# Patient Record
Sex: MALE | Race: WHITE | Employment: OTHER | URBAN - METROPOLITAN AREA
[De-identification: names, ages, dates, MRNs, and addresses within clinical notes are randomized per-mention and may not be internally consistent; named-entity substitution may affect disease eponyms.]

---

## 2021-06-09 ENCOUNTER — OFFICE VISIT (OUTPATIENT)
Dept: URGENT CARE | Age: 61
End: 2021-06-09

## 2021-06-09 VITALS
DIASTOLIC BLOOD PRESSURE: 91 MMHG | TEMPERATURE: 98.3 F | HEART RATE: 77 BPM | OXYGEN SATURATION: 97 % | RESPIRATION RATE: 18 BRPM | SYSTOLIC BLOOD PRESSURE: 166 MMHG

## 2021-06-09 DIAGNOSIS — L03.012 PARONYCHIA OF LEFT THUMB: ICD-10-CM

## 2021-06-09 DIAGNOSIS — S60.112A SUBUNGUAL HEMATOMA OF LEFT THUMB, INITIAL ENCOUNTER: Primary | ICD-10-CM

## 2021-06-09 PROCEDURE — 99202 OFFICE O/P NEW SF 15 MIN: CPT | Performed by: FAMILY MEDICINE

## 2021-06-09 RX ORDER — CEPHALEXIN 500 MG/1
500 CAPSULE ORAL 3 TIMES DAILY
Qty: 21 CAPSULE | Refills: 0 | Status: SHIPPED | OUTPATIENT
Start: 2021-06-09 | End: 2021-06-16

## 2021-06-09 NOTE — PATIENT INSTRUCTIONS
Object Under Fingernail: Care Instructions Your Care Instructions Small pieces (splinters) of wood, metal, glass, or plastic can get stuck under a fingernail. Thorns from [employer] and other plants also can prick or become stuck in the skin. Splinters can cause pain and infection if they are not removed. If your doctor removed part of your nail, it should grow back normally As your wound heals, it may get a little red or swollen. But it should get better with time. Follow-up care is a key part of your treatment and safety. Be sure to make and go to all appointments, and call your doctor if you are having problems. It's also a good idea to know your test results and keep a list of the medicines you take. How can you care for yourself at home? · If your doctor told you how to care for your wound, follow your doctor's instructions. If you did not get instructions, follow this general advice: 
? Wash the wound with clean water 2 times a day. Don't use hydrogen peroxide or alcohol, which can slow healing. ? You may cover the wound with a thin layer of petroleum jelly, such as Vaseline, and a nonstick bandage. ? Apply more petroleum jelly and replace the bandage as needed. · Your doctor may have used medicine to numb your finger. When it wears off, your pain may come back. Take an over-the-counter pain medicine, such as acetaminophen (Tylenol), ibuprofen (Advil, Motrin), or naproxen (Aleve). Be safe with medicines. Read and follow all instructions on the label. · Do not take two or more pain medicines at the same time unless the doctor told you to. Many pain medicines have acetaminophen, which is Tylenol. Too much acetaminophen (Tylenol) can be harmful. · If your doctor prescribed antibiotics, take them as directed. Do not stop taking them just because you feel better. You need to take the full course of antibiotics.  
· It may help to prop up your hand on a pillow anytime you sit or lie down during the next 3 days. Try to keep it above the level of your heart. This will help reduce swelling. When should you call for help? Call your doctor now or seek immediate medical care if: 
  · Your finger is cold or pale or changes color.  
  · You have new pain, or your pain gets worse.  
  · You have tingling, weakness, or numbness in your finger.  
  · You have symptoms of infection, such as: 
? Increased pain, swelling, warmth, or redness around the nail. ? Red streaks leading from the nail. ? Pus draining from the area. ? A fever.  
  · You bleed through your bandage. Watch closely for changes in your health, and be sure to contact your doctor if: 
  · You do not get better as expected.  
  · You think there is still something under the fingernail. Where can you learn more? Go to http://lkuas-raheem.info/ Enter L372 in the search box to learn more about \"Object Under Fingernail: Care Instructions. \" Current as of: February 26, 2020               Content Version: 12.8 © 2006-2021 [employer]. Care instructions adapted under license by [employer] (which disclaims liability or warranty for this information). If you have questions about a medical condition or this instruction, always ask your healthcare professional. [address] any warranty or liability for your use of this information. Paronychia: Care Instructions Overview Paronychia (say \"jqwj-ml-MZ-tonio-uh\") is an inflammation of the skin around a fingernail or toenail. It happens when germs enter through a break in the skin. If you had an abscess, your doctor may have made a small cut in the infected area to drain the pus. Most cases of paronychia improve in a few days. But watch your symptoms and follow your doctor's advice. Though rare, a mild case can turn into something more serious and infect your entire finger or toe. Also, it is possible for an infection to return.  
Follow-up care is a key part of your treatment and safety. Be sure to make and go to all appointments, and call your doctor if you are having problems. It's also a good idea to know your test results and keep a list of the medicines you take. How can you care for yourself at home? · If your doctor told you how to care for your infected nail, follow the doctor's instructions. If you did not get instructions, follow this general advice: 
? Wash the area with clean water 2 times a day. Don't use hydrogen peroxide or alcohol, which can slow healing. ? You may cover the area with a thin layer of petroleum jelly, such as Vaseline, and a nonstick bandage. ? Apply more petroleum jelly and replace the bandage as needed. · If your doctor prescribed antibiotics, take them as directed. Do not stop taking them just because you feel better. You need to take the full course of antibiotics. · Take an over-the-counter pain medicine, such as acetaminophen (Tylenol), ibuprofen (Advil, Motrin), or naproxen (Aleve). Read and follow all instructions on the label. · Do not take two or more pain medicines at the same time unless the doctor told you to. Many pain medicines have acetaminophen, which is Tylenol. Too much acetaminophen (Tylenol) can be harmful. · Prop up the toe or finger so that it is higher than the level of your heart. This will help with pain and swelling. · Apply heat. Put a warm water bottle, heating pad set on low, or warm cloth on your finger or toe. Do not go to sleep with a heating pad on your skin. · Soak the area in warm water twice a day for 15 minutes each time. After soaking, dry the area well and apply a thin layer of petroleum jelly, such as Vaseline. Put on a new bandage. When should you call for help? Call your doctor now or seek immediate medical care if: 
  · You have signs of new or worsening infection, such as: 
? Increased pain, swelling, warmth, or redness.  
? Red streaks leading from the infected skin. 
? Pus draining from the area. ? A fever. Watch closely for changes in your health, and be sure to contact your doctor if: 
  · You do not get better as expected. Where can you learn more? Go to http://www.alaniz.com/ Enter C435 in the search box to learn more about \"Paronychia: Care Instructions. \" Current as of: July 2, 2020               Content Version: 12.8 © 2006-2021 [employer], [employer]. Care instructions adapted under license by [employer] (which disclaims liability or warranty for this information). If you have questions about a medical condition or this instruction, always ask your healthcare professional. [place] [address] any warranty or liability for your use of this information.

## 2021-06-09 NOTE — PROGRESS NOTES
Finger Pain  This is a new problem. The current episode started 2 days ago (crushing injury of left thumnb on nail ). The problem occurs constantly. The problem has not changed since onset. Associated symptoms comments: subungual hematoma- swelling of thumb and pain with numbness- also some redness at base of nail-   His friend try to drain blood with not needle- not much came out . Exacerbated by: pressure. Nothing relieves the symptoms. He has tried nothing for the symptoms. History reviewed. No pertinent past medical history. History reviewed. No pertinent surgical history. History reviewed. No pertinent family history. Social History     Socioeconomic History    Marital status:      Spouse name: Not on file    Number of children: Not on file    Years of education: Not on file    Highest education level: Not on file   Occupational History    Not on file   Tobacco Use    Smoking status: Never Smoker    Smokeless tobacco: Never Used   Substance and Sexual Activity    Alcohol use: Yes     Comment: 3-4 beers or rum & coke daily    Drug use: Never    Sexual activity: Not on file   Other Topics Concern    Not on file   Social History Narrative    Not on file     Social Determinants of Health     Financial Resource Strain:     Difficulty of Paying Living Expenses:    Food Insecurity:     Worried About Running Out of Food in the Last Year:     [address] in the Last Year:    Transportation Needs:     Lack of Transportation (Medical):      Lack of Transportation (Non-Medical):    Physical Activity:     Days of Exercise per Week:     Minutes of Exercise per Session:    Stress:     Feeling of Stress :    Social Connections:     Frequency of Communication with Friends and Family:     Frequency of Social Gatherings with Friends and Family:     Attends [employer] Services:     Active Member of Clubs or Organizations:     Attends Club or Organization Meetings:     Marital Status:    Intimate Partner Violence:     Fear of Current or Ex-Partner:     Emotionally Abused:     Physically Abused:     Sexually Abused: ALLERGIES: Patient has no known allergies. Review of Systems   Musculoskeletal:        Swelling of left thumb    All other systems reviewed and are negative. Vitals:    06/09/21 1903   BP: (!) 166/91   Pulse: 77   Resp: 18   Temp: 98.3 °F (36.8 °C)   SpO2: 97%       Physical Exam  Vitals and nursing note reviewed. Musculoskeletal:      Left hand: Swelling and tenderness present. No bony tenderness. Normal range of motion. Normal strength. Normal sensation. Hands:          MDM    Procedures      ICD-10-CM ICD-9-CM    1. Subungual hematoma of left thumb, initial encounter  S60.112A 923.3    2. Paronychia of left thumb  L03.012 681.02    heat cautry used to drain blood through 2 hole paced   Antibiotic ointment dressing appled        Medications Ordered Today   Medications    cephALEXin (KEFLEX) 500 mg capsule     Sig: Take 1 Capsule by mouth three (3) times daily for 7 days. Dispense:  21 Capsule     Refill:  0     No results found for any visits on 06/09/21. The patients condition was discussed with the patient and they understand. The patient is to follow up with primary care doctor. If signs and symptoms become worse the pt is to go to the ER. The patient is to take medications as prescribed.

## 2021-07-14 ENCOUNTER — OFFICE VISIT (OUTPATIENT)
Dept: URGENT CARE | Age: 61
End: 2021-07-14
Payer: OTHER GOVERNMENT

## 2021-07-14 VITALS
SYSTOLIC BLOOD PRESSURE: 130 MMHG | DIASTOLIC BLOOD PRESSURE: 84 MMHG | WEIGHT: 252 LBS | OXYGEN SATURATION: 98 % | TEMPERATURE: 98.4 F | HEIGHT: 77 IN | BODY MASS INDEX: 29.76 KG/M2 | RESPIRATION RATE: 16 BRPM | HEART RATE: 65 BPM
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
## 2021-07-14 DIAGNOSIS — L03.114 CELLULITIS OF LEFT UPPER EXTREMITY: ICD-10-CM

## 2021-07-14 PROCEDURE — 99213 OFFICE O/P EST LOW 20 MIN: CPT | Performed by: EMERGENCY MEDICINE

## 2021-07-14 RX ORDER — CEPHALEXIN 500 MG/1
500 CAPSULE ORAL 3 TIMES DAILY
Qty: 21 CAPSULE | Refills: 0 | Status: SHIPPED | OUTPATIENT
Start: 2021-07-14 | End: 2021-07-21

## 2021-07-14 NOTE — PROGRESS NOTES
Pt bitten by an insect yesterday and increased redness and a lot of itching. No pain. No numbness or swelling. No constitutional sx. No red streaking. Has a h/o cellulitis from similar and is concerned. He is left hand dominant    The history is provided by the patient. History reviewed. No pertinent past medical history. History reviewed. No pertinent surgical history. History reviewed. No pertinent family history. Social History     Socioeconomic History    Marital status:      Spouse name: Not on file    Number of children: Not on file    Years of education: Not on file    Highest education level: Not on file   Occupational History    Not on file   Tobacco Use    Smoking status: Never Smoker    Smokeless tobacco: Never Used   Substance and Sexual Activity    Alcohol use: Yes     Comment: 3-4 beers or rum & coke daily    Drug use: Never    Sexual activity: Not on file   Other Topics Concern    Not on file   Social History Narrative    Not on file     Social Determinants of Health     Financial Resource Strain:     Difficulty of Paying Living Expenses:    Food Insecurity:     Worried About Running Out of Food in the Last Year:     [address] in the Last Year:    Transportation Needs:     Lack of Transportation (Medical):  Lack of Transportation (Non-Medical):    Physical Activity:     Days of Exercise per Week:     Minutes of Exercise per Session:    Stress:     Feeling of Stress :    Social Connections:     Frequency of Communication with Friends and Family:     Frequency of Social Gatherings with Friends and Family:     Attends Gn[employer] Services:     Active Member of Clubs or Organizations:     Attends Club or Organization Meetings:     Marital Status:    Intimate Partner Violence:     Fear of Current or Ex-Partner:     Emotionally Abused:     Physically Abused:     Sexually Abused:                  ALLERGIES: Patient has no known allergies. Review of Systems   Constitutional: Negative for chills, fatigue and fever. HENT: Negative for facial swelling, sore throat and trouble swallowing. Respiratory: Negative for chest tightness and shortness of breath. Cardiovascular: Negative for chest pain. Musculoskeletal: Negative for arthralgias, joint swelling and myalgias. Skin: Positive for color change and wound (bug bite and local erythema). Neurological: Negative for dizziness and headaches. Vitals:    07/14/21 1158   BP: 130/84   Pulse: 65   Resp: 16   Temp: 98.4 °F (36.9 °C)   SpO2: 98%   Weight: 252 lb (114.3 kg)   Height: 6' 5\" (1.956 m)       Physical Exam  Vitals and nursing note reviewed. Constitutional:       General: He is not in acute distress. Appearance: Normal appearance. He is normal weight. He is not ill-appearing or toxic-appearing. HENT:      Nose: No rhinorrhea. Mouth/Throat:      Mouth: Mucous membranes are moist.      Pharynx: Oropharynx is clear. No oropharyngeal exudate or posterior oropharyngeal erythema. Cardiovascular:      Rate and Rhythm: Normal rate and regular rhythm. Heart sounds: Normal heart sounds. Pulmonary:      Effort: Pulmonary effort is normal. No respiratory distress. Breath sounds: Normal breath sounds. No stridor. No wheezing, rhonchi or rales. Lymphadenopathy:      Cervical: No cervical adenopathy. Skin:     Capillary Refill: Capillary refill takes less than 2 seconds. Findings: Erythema and wound present. Urticarial rash: Willetta Smiyoni Comments: There is a slightly irregular area of NT, blanchable erythema on the ventral surface of the distal left FA with a central vesicle. The area is approximately 4cm in diameter. It was marked with a pen. There is not lymphatic streaking. There is no limitation noted in ROM of the left wrist, hand or FA. No redness distal. No swelling    Neurological:      General: No focal deficit present.       Mental Status: He is alert and oriented to person, place, and time. Psychiatric:         Mood and Affect: Mood normal.         [facility]    ICD-10-CM ICD-9-CM    1. Bug bite, initial encounter  W57. XXXA 919.4    2. Cellulitis of left upper extremity  L03.114 682.3      Medications Ordered Today   Medications    cephALEXin (Keflex) 500 mg capsule     Sig: Take 1 Capsule by mouth three (3) times daily for 7 days. Dispense:  21 Capsule     Refill:  0     The patient's condition and possible alternative diagnoses were discussed with the patient and they verbalized understanding. The patient is to follow up with their primary care doctor for continued care. If signs and symptoms persist or become worse or new symptoms develop, the pt is to go immediately to the emergency department. Any new medications that may have been written for should be taken as directed but should always be discussed with the primary care physician and pharmacist. This was communicated to the patient.              Procedures

## 2021-08-31 ENCOUNTER — APPOINTMENT (OUTPATIENT)
Dept: ULTRASOUND IMAGING | Age: 61
End: 2021-08-31
Attending: STUDENT IN AN ORGANIZED HEALTH CARE EDUCATION/TRAINING PROGRAM
Payer: OTHER GOVERNMENT

## 2021-08-31 ENCOUNTER — HOSPITAL ENCOUNTER (EMERGENCY)
Age: 61
Discharge: HOME | End: 2021-08-31
Attending: STUDENT IN AN ORGANIZED HEALTH CARE EDUCATION/TRAINING PROGRAM
Payer: OTHER GOVERNMENT

## 2021-08-31 VITALS
DIASTOLIC BLOOD PRESSURE: 74 MMHG | WEIGHT: 252.87 LBS | SYSTOLIC BLOOD PRESSURE: 127 MMHG | OXYGEN SATURATION: 97 % | HEIGHT: 77 IN | TEMPERATURE: 98.3 F | RESPIRATION RATE: 16 BRPM | BODY MASS INDEX: 29.86 KG/M2 | HEART RATE: 70 BPM

## 2021-08-31 DIAGNOSIS — I80.9 THROMBOPHLEBITIS: Primary | ICD-10-CM

## 2021-08-31 PROCEDURE — 93971 EXTREMITY STUDY: CPT

## 2021-08-31 PROCEDURE — 99283 EMERGENCY DEPT VISIT LOW MDM: CPT

## 2021-08-31 RX ORDER — NAPROXEN 500 MG/1
500 TABLET ORAL
Qty: 20 TABLET | Refills: 0 | Status: SHIPPED | OUTPATIENT
Start: 2021-08-31 | End: 2022-07-09

## 2021-08-31 NOTE — ED PROVIDER NOTES
EMERGENCY DEPARTMENT HISTORY AND PHYSICAL EXAM      Date: 8/31/2021  Patient Name: Pilar Bansal    History of Presenting Illness     Chief Complaint   Patient presents with    Leg Pain     redness and swelling left medial calf since  yesterday. pt had hernia surgery last week         HPI: Pilar Bansal, 57 y.o. male presents to the ED with cc of left leg pain. He noticed this yesterday. He reports increasing redness, swelling and tenderness to touch of his left leg, more over the medial aspect but now he feels the whole leg is swollen. He has a history of varicose veins, however they have never swollen to this extent before. No history of trauma. No fevers, no chest pain or shortness of breath, no weakness or numbness in this extremity. He did have hernia surgery 1 week ago, denies any complications related to that. There are no other complaints, changes, or physical findings at this time. PCP: Unknown, Provider, MD    No current facility-administered medications on file prior to encounter. Current Outpatient Medications on File Prior to Encounter   Medication Sig Dispense Refill    MEN'S MULTI-VITAMIN PO Take  by mouth. Past History     Past Medical History:  No past medical history on file. Past Surgical History:  No past surgical history on file. Family History:  No family history on file. Social History:  Social History     Tobacco Use    Smoking status: Never Smoker    Smokeless tobacco: Never Used   Substance Use Topics    Alcohol use: Yes     Comment: 3-4 beers or rum & coke daily    Drug use: Never       Allergies:  No Known Allergies      Review of Systems   no fever  No eye pain  No ear pain  no shortness of breath  no chest pain  no abdominal pain  no dysuria  Reports left leg pain    Physical Exam   Physical Exam  Constitutional:       General: He is not in acute distress. Appearance: He is not toxic-appearing. HENT:      Head: Normocephalic. Eyes:      Extraocular Movements: Extraocular movements intact. Cardiovascular:      Rate and Rhythm: Normal rate and regular rhythm. Pulmonary:      Effort: Pulmonary effort is normal.      Breath sounds: Normal breath sounds. Abdominal:      Palpations: Abdomen is soft. Tenderness: There is no abdominal tenderness. Musculoskeletal:      Comments: Left leg over the medial shin and there is approximately golf ball sized area of firm slightly tender erythema, there are varicose veins scattered over the legs. The left leg distal to the knee is slightly more swollen as compared to the right. He has strong DP pulses bilaterally. Sensation to light touch intact diffusely, 5 out of 5 strength with ankle flexion extension bilaterally. Skin:     General: Skin is warm and dry. Neurological:      General: No focal deficit present. Mental Status: He is alert. Psychiatric:         Mood and Affect: Mood normal.         Diagnostic Study Results     Labs -   No results found for this or any previous visit (from the past 24 hour(s)). Radiologic Studies -   DUPLEX LOWER EXT VENOUS LEFT    (Results Pending)     CT Results  (Last 48 hours)    None        CXR Results  (Last 48 hours)    None            Medical Decision Making   I am the first provider for this patient. I reviewed the vital signs, available nursing notes, past medical history, past surgical history, family history and social history. Vital Signs-Reviewed the patient's vital signs. Patient Vitals for the past 24 hrs:   Temp Pulse Resp BP SpO2   08/31/21 1235 98.3 °F (36.8 °C) 70 16 (!) 141/83 97 %         Provider Notes (Medical Decision Making):   59-year-old male presenting with left leg pain. He has focal area of redness and swelling and slight tenderness over the medial leg. Concern for possible superficial thrombophlebitis, will assess for DVT with duplex. Possible early cellulitis. No signs of abscess and no fluctuance. He is neurovascularly intact, no signs of systemic infection. He denies any cardiopulmonary complaints. ED Course:     Initial assessment performed. The patients presenting problems have been discussed, and they are in agreement with the care plan formulated and outlined with them. I have encouraged them to ask questions as they arise throughout their visit. Ultrasound shows thrombosed varicosity, no evidence of DVT. Findings are relayed to the patient. Patient is counseled on supportive care and return precautions. Will return to the ED for any worsening pain, redness, swelling, chest pain, shortness of breath, fever, or any new or worrisome symptoms. Will followup with primary care doctor, vascular clinic within 7 days. Critical Care Time:         Disposition:  Home    PLAN:  1. Current Discharge Medication List        2.    Follow-up Information    None       Return to ED if worse     Diagnosis     Clinical Impression: Acute superficial thrombophlebitis

## 2022-01-31 ENCOUNTER — OFFICE VISIT (OUTPATIENT)
Dept: FAMILY MEDICINE CLINIC | Age: 62
End: 2022-01-31
Payer: OTHER GOVERNMENT

## 2022-01-31 VITALS
DIASTOLIC BLOOD PRESSURE: 87 MMHG | RESPIRATION RATE: 18 BRPM | HEIGHT: 77 IN | HEART RATE: 59 BPM | SYSTOLIC BLOOD PRESSURE: 137 MMHG | TEMPERATURE: 98.3 F | WEIGHT: 251.4 LBS | OXYGEN SATURATION: 98 % | BODY MASS INDEX: 29.68 KG/M2

## 2022-01-31 DIAGNOSIS — Z13.29 SCREENING FOR THYROID DISORDER: ICD-10-CM

## 2022-01-31 DIAGNOSIS — Z13.220 SCREENING, LIPID: ICD-10-CM

## 2022-01-31 DIAGNOSIS — Z12.5 SPECIAL SCREENING EXAMINATION FOR NEOPLASM OF PROSTATE: ICD-10-CM

## 2022-01-31 DIAGNOSIS — Z00.00 LABORATORY EXAM ORDERED AS PART OF ROUTINE GENERAL MEDICAL EXAMINATION: ICD-10-CM

## 2022-01-31 DIAGNOSIS — E55.9 VITAMIN D INSUFFICIENCY: ICD-10-CM

## 2022-01-31 DIAGNOSIS — Z11.59 ENCOUNTER FOR HEPATITIS C SCREENING TEST FOR LOW RISK PATIENT: ICD-10-CM

## 2022-01-31 DIAGNOSIS — I83.893 VARICOSE VEINS OF BILATERAL LOWER EXTREMITIES WITH OTHER COMPLICATIONS: ICD-10-CM

## 2022-01-31 DIAGNOSIS — Z00.00 ENCOUNTER FOR MEDICAL EXAMINATION TO ESTABLISH CARE: Primary | ICD-10-CM

## 2022-01-31 DIAGNOSIS — R22.43 LOCALIZED SWELLING OF BOTH LOWER LEGS: ICD-10-CM

## 2022-01-31 LAB
25(OH)D3 SERPL-MCNC: 27.1 NG/ML (ref 30–100)
ALBUMIN SERPL-MCNC: 3.9 G/DL (ref 3.5–5)
ALBUMIN/GLOB SERPL: 1 {RATIO} (ref 1.1–2.2)
ALP SERPL-CCNC: 67 U/L (ref 45–117)
ALT SERPL-CCNC: 26 U/L (ref 12–78)
ANION GAP SERPL CALC-SCNC: 1 MMOL/L (ref 5–15)
AST SERPL-CCNC: 23 U/L (ref 15–37)
BASOPHILS # BLD: 0.1 K/UL (ref 0–0.1)
BASOPHILS NFR BLD: 1 % (ref 0–1)
BILIRUB SERPL-MCNC: 0.8 MG/DL (ref 0.2–1)
BUN SERPL-MCNC: 14 MG/DL (ref 6–20)
BUN/CREAT SERPL: 14 (ref 12–20)
CALCIUM SERPL-MCNC: 9.3 MG/DL (ref 8.5–10.1)
CHLORIDE SERPL-SCNC: 106 MMOL/L (ref 97–108)
CHOLEST SERPL-MCNC: 197 MG/DL
CO2 SERPL-SCNC: 29 MMOL/L (ref 21–32)
CREAT SERPL-MCNC: 0.97 MG/DL (ref 0.7–1.3)
DIFFERENTIAL METHOD BLD: ABNORMAL
EOSINOPHIL # BLD: 0.1 K/UL (ref 0–0.4)
EOSINOPHIL NFR BLD: 2 % (ref 0–7)
ERYTHROCYTE [DISTWIDTH] IN BLOOD BY AUTOMATED COUNT: 12.3 % (ref 11.5–14.5)
GLOBULIN SER CALC-MCNC: 4 G/DL (ref 2–4)
GLUCOSE SERPL-MCNC: 94 MG/DL (ref 65–100)
HCT VFR BLD AUTO: 45.2 % (ref 36.6–50.3)
HCV AB SERPL QL IA: NONREACTIVE
HDLC SERPL-MCNC: 61 MG/DL
HDLC SERPL: 3.2 {RATIO} (ref 0–5)
HGB BLD-MCNC: 14.6 G/DL (ref 12.1–17)
IMM GRANULOCYTES # BLD AUTO: 0 K/UL (ref 0–0.04)
IMM GRANULOCYTES NFR BLD AUTO: 1 % (ref 0–0.5)
LDLC SERPL CALC-MCNC: 122.4 MG/DL (ref 0–100)
LYMPHOCYTES # BLD: 1.3 K/UL (ref 0.8–3.5)
LYMPHOCYTES NFR BLD: 28 % (ref 12–49)
MCH RBC QN AUTO: 32.4 PG (ref 26–34)
MCHC RBC AUTO-ENTMCNC: 32.3 G/DL (ref 30–36.5)
MCV RBC AUTO: 100.2 FL (ref 80–99)
MONOCYTES # BLD: 0.7 K/UL (ref 0–1)
MONOCYTES NFR BLD: 15 % (ref 5–13)
NEUTS SEG # BLD: 2.5 K/UL (ref 1.8–8)
NEUTS SEG NFR BLD: 53 % (ref 32–75)
NRBC # BLD: 0 K/UL (ref 0–0.01)
NRBC BLD-RTO: 0 PER 100 WBC
PLATELET # BLD AUTO: 299 K/UL (ref 150–400)
PMV BLD AUTO: 9.1 FL (ref 8.9–12.9)
POTASSIUM SERPL-SCNC: 4.6 MMOL/L (ref 3.5–5.1)
PROT SERPL-MCNC: 7.9 G/DL (ref 6.4–8.2)
PSA SERPL-MCNC: 0.9 NG/ML (ref 0.01–4)
RBC # BLD AUTO: 4.51 M/UL (ref 4.1–5.7)
SODIUM SERPL-SCNC: 136 MMOL/L (ref 136–145)
TRIGL SERPL-MCNC: 68 MG/DL (ref ?–150)
TSH SERPL DL<=0.05 MIU/L-ACNC: 2.21 UIU/ML (ref 0.36–3.74)
VLDLC SERPL CALC-MCNC: 13.6 MG/DL
WBC # BLD AUTO: 4.7 K/UL (ref 4.1–11.1)

## 2022-01-31 PROCEDURE — 99386 PREV VISIT NEW AGE 40-64: CPT | Performed by: NURSE PRACTITIONER

## 2022-01-31 RX ORDER — ASCORBIC ACID 500 MG
TABLET ORAL
COMMUNITY

## 2022-01-31 NOTE — PROGRESS NOTES
Chief Complaint   Patient presents with    New Patient    Physical     Pt state he is having ankle swelling for months. 1. Have you been to the ER, urgent care clinic since your last visit? Hospitalized since your last visit? No    2. Have you seen or consulted any other health care providers outside of the [address] [place] [address] since your last visit? Include any pap smears or colon screening.  No    Visit Vitals  /87 (BP 1 Location: Left arm, BP Patient Position: Sitting)   Pulse (!) 59   Temp 98.3 °F (36.8 °C) (Oral)   Resp 18   Ht 6' 5\" (1.956 m)   Wt 251 lb 6.4 oz (114 kg)   SpO2 98%   BMI 29.81 kg/m²

## 2022-01-31 NOTE — PATIENT INSTRUCTIONS
Well Visit, Men 48 to 72: Care Instructions  Overview     Well visits can help you stay healthy. Your doctor has checked your overall health and may have suggested ways to take good care of yourself. Your doctor also may have recommended tests. At home, you can help prevent illness with healthy eating, regular exercise, and other steps. Follow-up care is a key part of your treatment and safety. Be sure to make and go to all appointments, and call your doctor if you are having problems. It's also a good idea to know your test results and keep a list of the medicines you take. How can you care for yourself at home? · Get screening tests that you and your doctor decide on. Screening helps find diseases before any symptoms appear. · Eat healthy foods. Choose fruits, vegetables, whole grains, protein, and low-fat dairy foods. Limit fat, especially saturated fat. Reduce salt in your diet. · Limit alcohol. Have no more than 2 drinks a day or 14 drinks a week. · Get at least 30 minutes of exercise on most days of the week. Walking is a good choice. You also may want to do other activities, such as running, swimming, cycling, or playing tennis or team sports. · Reach and stay at a healthy weight. This will lower your risk for many problems, such as obesity, diabetes, heart disease, and high blood pressure. · Do not smoke. Smoking can make health problems worse. If you need help quitting, talk to your doctor about stop-smoking programs and medicines. These can increase your chances of quitting for good. · Care for your mental health. It is easy to get weighed down by worry and stress. Learn strategies to manage stress, like deep breathing and mindfulness, and stay connected with your family and community. If you find you often feel sad or hopeless, talk with your doctor. Treatment can help. · Talk to your doctor about whether you have any risk factors for sexually transmitted infections (STIs).  You can help prevent STIs if you wait to have sex with a new partner (or partners) until you've each been tested for STIs. It also helps if you use condoms (male or female condoms) and if you limit your sex partners to one person who only has sex with you. Vaccines are available for some STIs. · If it's important to you to prevent pregnancy with your partner, talk with your doctor about birth control options that might be best for you. · If you think you may have a problem with alcohol or drug use, talk to your doctor. This includes prescription medicines (such as amphetamines and opioids) and illegal drugs (such as cocaine and methamphetamine). Your doctor can help you figure out what type of treatment is best for you. · Protect your skin from too much sun. When you're outdoors from 10 a.m. to 4 p.m., stay in the shade or cover up with clothing and a hat with a wide brim. Wear sunglasses that block UV rays. Even when it's cloudy, put broad-spectrum sunscreen (SPF 30 or higher) on any exposed skin. · See a dentist one or two times a year for checkups and to have your teeth cleaned. · Wear a seat belt in the car. When should you call for help? Watch closely for changes in your health, and be sure to contact your doctor if you have any problems or symptoms that concern you. Where can you learn more? Go to http://www.gray.com/  Enter D044 in the search box to learn more about \"Well Visit, Men 48 to 72: Care Instructions. \"  Current as of: February 11, 2021               Content Version: 13.0  © 1202-3361 Healthwise, Incorporated. Care instructions adapted under license by [employer] (which disclaims liability or warranty for this information). If you have questions about a medical condition or this instruction, always ask your healthcare professional. [place] [address] any warranty or liability for your use of this information.          Learning About Simple Laser Treatment for Veins  What is simple laser treatment for veins? Simple laser vein treatment is used to treat tiny varicose veins and spider veins that are just under the skin's surface. A laser is a focused beam of light. This type of laser is aimed at the outside of your skin. This treatment uses the heat of the laser to scar and close varicose veins. Varicose veins look dark blue, swollen, and twisted under the skin. They develop most often in the legs and ankles. They are caused by weakened valves and veins in your legs and other places in your body. These valves keep blood flowing toward your heart. When these valves don't work as they should, blood collects, and pressure builds up. The pressure causes the veins to become twisted and enlarged. Spider veins are a form of varicose veins. They look like thin, bluish webs. How is it done? The procedure is usually done at your doctor's office. You will probably have to wear some kind of eye protection. The laser is aimed at an affected vein. The laser light goes through the skin to treat the vein. You won't have any cuts, but the heat from the laser may cause some discomfort. The laser causes scar tissue that closes the vein. After it is closed, a vein loses its source of blood. In time, the vein is harder to see. A session takes 15 to 20 minutes. Usually more than one laser session is needed. You might have another session in 6 to 12 weeks. What are the risks of laser treatment? Risks of laser treatment include:  · Skin burns. · Changes in the color of the skin. · Nerve damage that causes burning, pain, or a prickly feeling after recovery. What can you expect after treatment? You will be able to go back to your daily routine right away. You may have a few bruises along the length of the treated vein. Your doctor may put a bandage on the area. You also may have redness or swelling in your legs for a few days.   Your doctor will tell you if compression stockings will be helpful for you. How can you care for yourself at home? Home treatment can relieve symptoms and slow down the progress of varicose veins. Here are some things you can do at home:  · Exercise. Staying active improves blood circulation in your legs. · Stay at a healthy weight. Being overweight can increase the swelling and discomfort of varicose veins. · Avoid long periods of sitting or standing. Follow-up care is a key part of your treatment and safety. Be sure to make and go to all appointments, and call your doctor if you are having problems. It's also a good idea to know your test results and keep a list of the medicines you take. Where can you learn more? Go to http://www.gray.com/  Enter O629 in the search box to learn more about \"Learning About Simple Laser Treatment for Veins. \"  Current as of: July 6, 2021               Content Version: 13.0  © 0556-9261 [employer]. Care instructions adapted under license by [employer] (which disclaims liability or warranty for this information). If you have questions about a medical condition or this instruction, always ask your healthcare professional. [place] [address] any warranty or liability for your use of this information. Varicose Veins: Care Instructions  Your Care Instructions  Varicose veins are twisted, enlarged veins near the surface of the skin. They develop most often in the legs and ankles. Some people may be more likely than others to get varicose veins because of aging or hormone changes or because a parent has them. Being overweight or pregnant can make varicose veins worse. Jobs that require standing for long periods of time also can make them worse. Follow-up care is a key part of your treatment and safety. Be sure to make and go to all appointments, and call your doctor if you are having problems.  It's also a good idea to know your test results and keep a list of the medicines you take. How can you care for yourself at home? · Wear compression stockings during the day to help relieve symptoms. They improve blood flow and are the main treatment for varicose veins. Talk to your doctor about which ones to get and where to get them. · Prop up your legs at or above the level of your heart when possible. This helps keep the blood from pooling in your lower legs and improves blood flow to the rest of your body. · Avoid sitting and standing for long periods. This puts added stress on your veins. · Get regular exercise, and control your weight. Walk, bicycle, or swim to improve blood flow in your legs. · If you bump your leg so hard that you know it is likely to bruise, prop up your leg and put ice or a cold pack on the area for 10 to 20 minutes at a time. Try to do this every 1 to 2 hours for the next 3 days (when you are awake) or until the swelling goes down. Put a thin cloth between the ice and your skin. · If you cut or scratch the skin over a vein, it may bleed a lot. Prop up your leg and apply firm pressure with a clean bandage over the site of the bleeding. Continue to apply pressure for a full 15 minutes. Do not check sooner to see if the bleeding has stopped. If the bleeding has not stopped after 15 minutes, apply pressure again for another 15 minutes. You can repeat this up to 3 times for a total of 45 minutes. If you have a blood clot in a varicose vein, you may have tenderness and swelling over the vein. The vein may feel firm. Be sure to call your doctor right away if you have these symptoms. If your doctor has told you how to care for the clot, follow his or her instructions. Care may include the following:  · Prop up your leg and apply heat with a warm, damp cloth or a heating pad set on low (put a towel or cloth between your leg and the heating pad to prevent burns).   · Ask your doctor if you can take an over-the-counter pain medicine, such as acetaminophen (Tylenol), ibuprofen (Advil, Motrin), or naproxen (Aleve). Be safe with medicines. Read and follow all instructions on the label. When should you call for help? Call 911 anytime you think you may need emergency care. For example, call if:    · You have sudden chest pain and shortness of breath, or you cough up blood. Call your doctor now or seek immediate medical care if:    · You have signs of a blood clot, such as:  ? Pain in your calf, back of the knee, thigh, or groin. ? Redness and swelling in your leg or groin.     · A varicose vein begins to bleed and you cannot stop it.     · You have a tender lump in your leg.     · You get an open sore. Watch closely for changes in your health, and be sure to contact your doctor if:    · Your varicose vein symptoms do not improve with home treatment. Where can you learn more? Go to http://www.gray.com/  Enter B637 in the search box to learn more about \"Varicose Veins: Care Instructions. \"  Current as of: July 6, 2021               Content Version: 13.0  © 4119-6867 [employer]. Care instructions adapted under license by [employer] (which disclaims liability or warranty for this information). If you have questions about a medical condition or this instruction, always ask your healthcare professional. [address] any warranty or liability for your use of this information.

## 2022-01-31 NOTE — PROGRESS NOTES
Chief Complaint   Patient presents with    New Patient    Physical       HPI:  Marlen Richards is a 57y.o. year old male who is a new patient and is here to establish care. He was previous followed by [facility] [employer] (retired and was living in [place] until May of this year). Patient reports that he has had Bilateral lower extremity swelling  (left a little worse than right) for several months. No pain. No leg fatigue. Is able to exercise without difficulty. Does note that he had a \"superficial blood clot\" to the left lower leg 8/31/21. Went to ER and diagnosed with thrombophlebitis, no DVT  Notes that he has some varicose and spider veins. Has tried compression socks in the past, did not notice any improvement in swelling. History of hernia surgery 2/21 and repeat surgery in 8/21 (left inguinal hernia). No urinary issues. 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 The following sections were reviewed & updated as appropriate: PMH, PL, PSH, and SH. Health Maintenance:     Past Medical History:   Diagnosis Date    Inguinal hernia     Spider veins     Varicose vein of leg      Past Surgical History:   Procedure Laterality Date    HX COLONOSCOPY      HX HERNIA REPAIR  2021    HX ORTHOPAEDIC  2008           Prior to Admission medications    Medication Sig Start Date End Date Taking? Authorizing Provider   ascorbic acid, vitamin C, (Vitamin C) 500 mg tablet Take  by mouth. Yes Provider, Historical   MEN'S MULTI-VITAMIN PO Take  by mouth. Yes Provider, Historical   naproxen (NAPROSYN) 500 mg tablet Take 1 Tablet by mouth every twelve (12) hours as needed for Pain.   Patient not taking: Reported on 1/31/2022 8/31/21   Fadi Hoover MD          No Known Allergies         ROS:  Gen: no fatigue, fever, chills  Eyes: no excessive tearing, itching, or discharge  Nose: no rhinorrhea, no sinus pain  Mouth: no oral lesions, no sore throat  Resp: no shortness of breath, no wheezing, no cough  CV: no chest pain, no paroxysmal nocturnal dyspnea  Abd: no nausea, no heartburn, no diarrhea, no constipation, no abdominal pain  Neuro: no headaches, no syncope or presyncopal episodes  Endo: no polyuria, no polydipsia  Heme: no lymphadenopathy, no easy bruising or bleeding      Objective:    Visit Vitals  /87 (BP 1 Location: Left arm, BP Patient Position: Sitting)   Pulse (!) 59   Temp 98.3 °F (36.8 °C) (Oral)   Resp 18   Ht 6' 5\" (1.956 m)   Wt 251 lb 6.4 oz (114 kg)   SpO2 98%   BMI 29.81 kg/m²     Gen: alert, oriented, no acute distress  Head: normocephalic, atraumatic  Ears: external auditory canals clear, TMs without erythema or effusion  Eyes: pupils equal round reactive to light, sclera clear, conjunctiva clear  Nose: normal turbinates, no rhinorrhea  Neck: supple, no lymphadenopathy  Resp: no increased work of breathing, lungs clear to ausculation bilaterally  CV: S1, S2 normal, no murmurs, rubs, or gallops. Abd: soft, not tender, not distended. No hepatosplenomegaly. Normal bowel sounds. No hernias. Neuro: cranial nerves intact, normal strength and movement in all extremities, and sensation intact and symmetric. Skin: no lesion or rash  Legs:  Bilateral lower extremity pitting edema 1+ pitting, LEFT > RIGHT, warm feet, faint DP/PT. Assessment & Plan:  Differential diagnosis and treatment options reviewed with patient who is in agreement with treatment plan as outlined below. ICD-10-CM ICD-9-CM    1. Encounter for medical examination to establish care  Z00.00 V70.9    2.  Varicose veins of bilateral lower extremities with other complications  H65.161 152.1 REFERRAL TO VASCULAR SURGERY   3. Localized swelling of both lower legs  R22.43 729.81 REFERRAL TO VASCULAR SURGERY   4. Encounter for hepatitis C screening test for low risk patient  Z11.59 V73.89 HEPATITIS C AB      HEPATITIS C AB   5. Screening for thyroid disorder  Z13.29 V77.0 TSH 3RD GENERATION      TSH 3RD GENERATION   6. Screening, lipid  Z13.220 V77.91 LIPID PANEL      LIPID PANEL   7. Vitamin D insufficiency  E55.9 268.9 VITAMIN D, 25 HYDROXY      VITAMIN D, 25 HYDROXY   8. Special screening examination for neoplasm of prostate  Z12.5 V76.44 PSA, DIAGNOSTIC (PROSTATE SPECIFIC AG)      PSA, DIAGNOSTIC (PROSTATE SPECIFIC AG)   9. Laboratory exam ordered as part of routine general medical examination  Z00.00 V72.62 TSH 3RD GENERATION      LIPID PANEL      CBC WITH AUTOMATED DIFF      METABOLIC PANEL, COMPREHENSIVE      METABOLIC PANEL, COMPREHENSIVE      CBC WITH AUTOMATED DIFF      LIPID PANEL      TSH 3RD GENERATION     Labs today. Refer to vascular for further evaluation or varicose veins with swelling. Encouraged SHARITA stockings. DASH diet discussed. Discussed BMI and healthy weight. Encouraged patient to work to implement changes including diet high in raw fruits and vegetables, lean protein and good fats. Limit refined, processed carbohydrates and sugar. Encouraged regular exercise. Verbal and written instructions (see AVS) provided. Patient expresses understanding and agreement of diagnosis and treatment plan.

## 2022-02-04 NOTE — PROGRESS NOTES
Sent to [facility] Mr. Kayla Mitchell labs are back. Vitamin D is a little low, I suggest that you take over-the-counter vitamin D3 2000 to 4000 units/day. Total cholesterol is okay, your LDL which is your bad cholesterol is slightly elevated at 122. Try these lifestyle strategies to lower your cholesterol. Decrease saturated fats in diet. Saturated fats are found in:    meats including fatty beef, pork, lamb, chicken or turkey with skin, and ground beef,   high fat cheese,   whole fat diary, milk, cream and ice cream,  Butter  Most saturated fats are found in animal products  Eliminate Trans Fats from your diet. Foods that contain TF include: Fast foods: fried chicken, biscuits, fried fish for fried fish sandwich's, Western Larissa fries  Donuts and muffins  Crackers and cookies  Cake, cake icing and pies  Canned biscuits or refrigerated dough  Microwave popcorn  Coffee creamer  Frozen pizza  Sick margarine or vegetable shortening  Increase the amount of soluble fiber in your diet. Sources of soluble fiber include:  oats, peas, beans, apples, citrus fruits, carrots, barley and psyllium  Include omega-3 fatty acids in your diet, these are found in:   FISH! Try and eat 2 servings of fish a week. Good examples include salmon, albacore tuna, halibut, trout and mackerel. Take a fish oil supplement daily  Look for foods enriched with plant-sterols. There are many products on the market which are fortified with this nutrient including buttery spreads and yogurts. Look for the [employer] and Promise brands. Increase your physical activity to at least 150 minutes a week. This is just 5 sessions of thirty minutes a week! If you are overweight, losing weight can significantly lower your cholesterol. If you are a smoker, QUIT SMOKING, this can significantly lower your cholesterol and overall cardiovascular risk. It also can help to decrease your risk for many other conditions. Rest of your labs look okay.   Were you able to get in with the vascular doctor? Let me know if you have any questions. If your swelling persist we could consider doing a little diuretic to see if we can get some of that fluid off but would prefer vascular to check you out as well. Thomas,Lillian Vivar, JEN

## 2022-07-09 ENCOUNTER — OFFICE VISIT (OUTPATIENT)
Dept: URGENT CARE | Age: 62
End: 2022-07-09
Payer: OTHER GOVERNMENT

## 2022-07-09 VITALS
SYSTOLIC BLOOD PRESSURE: 159 MMHG | RESPIRATION RATE: 16 BRPM | TEMPERATURE: 98.2 F | HEART RATE: 55 BPM | OXYGEN SATURATION: 98 % | DIASTOLIC BLOOD PRESSURE: 94 MMHG

## 2022-07-09 DIAGNOSIS — I77.6 VASCULITIS (HCC): Primary | ICD-10-CM

## 2022-07-09 PROCEDURE — 99213 OFFICE O/P EST LOW 20 MIN: CPT | Performed by: NURSE PRACTITIONER

## 2022-07-09 RX ORDER — PREDNISONE 5 MG/1
TABLET ORAL
Qty: 21 TABLET | Refills: 0 | Status: SHIPPED | OUTPATIENT
Start: 2022-07-09 | End: 2022-10-20

## 2022-07-09 NOTE — PROGRESS NOTES
HISTORY OF PRESENT ILLNESS  Stef Christina is a 60 y.o. male. Patient presents with what he indicates is a rash on the right anterior lower leg. He states this presented about 3 days ago and is a little tender but otherwise not very painful. The area is not itchy. He states it has not changed in size since he noticed its appearance. He denies any contact with new materials, medications, animals. He has not taken anything for the rash or applied anything to it. Patient denies any recent illness. He denies fevers or chills. Review of Systems   Constitutional: Negative for chills, fever and malaise/fatigue. Skin: Positive for rash. All other systems reviewed and are negative. Physical Exam  Constitutional:       General: He is not in acute distress. Appearance: He is well-developed. He is not ill-appearing or diaphoretic. Skin:     General: Skin is warm. Findings: Petechiae and rash present. Rash is purpuric. Neurological:      Mental Status: He is alert. Psychiatric:         Behavior: Behavior is cooperative. ASSESSMENT and PLAN    ICD-10-CM ICD-9-CM    1. Vasculitis (HCC)  I77.6 447.6      Medications Ordered Today   Medications    predniSONE (STERAPRED) 5 mg dose pack     Sig: See administration instruction per 5mg dose pack     Dispense:  21 Tablet     Refill:  0     No results found for any visits on 07/09/22. The patients condition was discussed with the patient and they understand. The patient is to follow up with primary care doctor. If signs and symptoms become worse the pt is to go to the ER. The patient is to take medications as prescribed. Discussed possibility of vasculitis with patient. While this is a very broad diagnoses and not confirmed by biopsy in the setting it has the appearance of a vasculitis type condition.   Explained possible treatment options with the patient and in agreement with trying prednisone for short course to see if this improves his symptoms. He understands, again, that this is not a confirmed diagnosis and that if the area worsens or fails to improve he should follow-up with primary care to determine if biopsy or further work-up is needed.

## 2022-07-09 NOTE — PATIENT INSTRUCTIONS
Learning About Vasculitis  What is vasculitis? Vasculitis means inflamed blood vessels. This happens when the body's own immune system attacks the blood vessels. Your immune system might be reacting to an infection or a medicine. Or you may have an immune disorder. Vasculitis causes blood vessel walls to thicken, weaken, or stretch. This makes it harder for blood to flow. And that can lead to symptoms in any parts of your body that aren't getting enough blood. There are different types of vasculitis. And different parts of the body can be affected. This includes the head, joints and muscles, the skin, and some internal organs. What can you expect when you have vasculitis? Vasculitis can cause a wide variety of symptoms. Which ones you have will depend on what blood vessels are involved and how serious the problem is. Some common symptoms are:  · Fever. · Losing weight and not feeling hungry. · General tiredness. · General aches and pains. · Pain and swelling in an arm, a leg, or some other body part where the blood vessels are inflamed. For some people, the problem is short-term. For others it is long-term, or chronic. This problem may also go away, only to come back again later. How is it treated? The main goal of treatment is to reduce inflammation in the blood vessels. Mild cases may go away on their own. Sometimes over-the-counter pain medicine helps. For more severe cases, a doctor might prescribe stronger medicine, such as a corticosteroid. Follow-up care is a key part of your treatment and safety. Be sure to make and go to all appointments, and call your doctor if you are having problems. It's also a good idea to know your test results and keep a list of the medicines you take. Where can you learn more? Go to http://www.gray.com/  Enter P353 in the search box to learn more about \"Learning About Vasculitis. \"  Current as of: July 1, 2021               Content Version: 13.2  © 7655-9068 Healthwise, Incorporated. Care instructions adapted under license by [employer] (which disclaims liability or warranty for this information). If you have questions about a medical condition or this instruction, always ask your healthcare professional. [address] any warranty or liability for your use of this information.

## 2022-10-16 ENCOUNTER — HOSPITAL ENCOUNTER (INPATIENT)
Age: 62
LOS: 4 days | Discharge: HOME | DRG: 603 | End: 2022-10-20
Attending: STUDENT IN AN ORGANIZED HEALTH CARE EDUCATION/TRAINING PROGRAM | Admitting: STUDENT IN AN ORGANIZED HEALTH CARE EDUCATION/TRAINING PROGRAM
Payer: OTHER GOVERNMENT

## 2022-10-16 ENCOUNTER — APPOINTMENT (OUTPATIENT)
Dept: ULTRASOUND IMAGING | Age: 62
DRG: 603 | End: 2022-10-16
Attending: EMERGENCY MEDICINE
Payer: OTHER GOVERNMENT

## 2022-10-16 DIAGNOSIS — Z77.111 EXPOSURE TO WATER POLLUTION: ICD-10-CM

## 2022-10-16 DIAGNOSIS — L03.116 CELLULITIS OF LEFT LOWER EXTREMITY: Primary | ICD-10-CM

## 2022-10-16 DIAGNOSIS — I83.92 VARICOSE VEINS OF LEFT LOWER EXTREMITY, UNSPECIFIED WHETHER COMPLICATED: ICD-10-CM

## 2022-10-16 DIAGNOSIS — Z87.2 HISTORY OF CELLULITIS: ICD-10-CM

## 2022-10-16 PROBLEM — L03.90 CELLULITIS: Status: ACTIVE | Noted: 2022-10-16

## 2022-10-16 LAB
ALBUMIN SERPL-MCNC: 3.2 G/DL (ref 3.5–5)
ALBUMIN/GLOB SERPL: 0.7 {RATIO} (ref 1.1–2.2)
ALP SERPL-CCNC: 65 U/L (ref 45–117)
ALT SERPL-CCNC: 31 U/L (ref 12–78)
ANION GAP SERPL CALC-SCNC: 4 MMOL/L (ref 5–15)
AST SERPL-CCNC: 25 U/L (ref 15–37)
BASOPHILS # BLD: 0.1 K/UL (ref 0–0.1)
BASOPHILS NFR BLD: 1 % (ref 0–1)
BILIRUB SERPL-MCNC: 0.5 MG/DL (ref 0.2–1)
BUN SERPL-MCNC: 19 MG/DL (ref 6–20)
BUN/CREAT SERPL: 17 (ref 12–20)
CALCIUM SERPL-MCNC: 8.9 MG/DL (ref 8.5–10.1)
CHLORIDE SERPL-SCNC: 105 MMOL/L (ref 97–108)
CO2 SERPL-SCNC: 30 MMOL/L (ref 21–32)
CREAT SERPL-MCNC: 1.12 MG/DL (ref 0.7–1.3)
DIFFERENTIAL METHOD BLD: ABNORMAL
EOSINOPHIL # BLD: 0.2 K/UL (ref 0–0.4)
EOSINOPHIL NFR BLD: 3 % (ref 0–7)
ERYTHROCYTE [DISTWIDTH] IN BLOOD BY AUTOMATED COUNT: 12 % (ref 11.5–14.5)
GLOBULIN SER CALC-MCNC: 4.8 G/DL (ref 2–4)
GLUCOSE SERPL-MCNC: 103 MG/DL (ref 65–100)
HCT VFR BLD AUTO: 40.6 % (ref 36.6–50.3)
HGB BLD-MCNC: 13.4 G/DL (ref 12.1–17)
IMM GRANULOCYTES # BLD AUTO: 0.1 K/UL (ref 0–0.04)
IMM GRANULOCYTES NFR BLD AUTO: 1 % (ref 0–0.5)
LYMPHOCYTES # BLD: 1 K/UL (ref 0.8–3.5)
LYMPHOCYTES NFR BLD: 16 % (ref 12–49)
MCH RBC QN AUTO: 33.8 PG (ref 26–34)
MCHC RBC AUTO-ENTMCNC: 33 G/DL (ref 30–36.5)
MCV RBC AUTO: 102.3 FL (ref 80–99)
MONOCYTES # BLD: 1.2 K/UL (ref 0–1)
MONOCYTES NFR BLD: 18 % (ref 5–13)
NEUTS SEG # BLD: 4.1 K/UL (ref 1.8–8)
NEUTS SEG NFR BLD: 61 % (ref 32–75)
NRBC # BLD: 0 K/UL (ref 0–0.01)
NRBC BLD-RTO: 0 PER 100 WBC
PLATELET # BLD AUTO: 320 K/UL (ref 150–400)
PMV BLD AUTO: 8.7 FL (ref 8.9–12.9)
POTASSIUM SERPL-SCNC: 3.9 MMOL/L (ref 3.5–5.1)
PROT SERPL-MCNC: 8 G/DL (ref 6.4–8.2)
RBC # BLD AUTO: 3.97 M/UL (ref 4.1–5.7)
SODIUM SERPL-SCNC: 139 MMOL/L (ref 136–145)
WBC # BLD AUTO: 6.6 K/UL (ref 4.1–11.1)

## 2022-10-16 PROCEDURE — 93971 EXTREMITY STUDY: CPT

## 2022-10-16 PROCEDURE — 99285 EMERGENCY DEPT VISIT HI MDM: CPT

## 2022-10-16 PROCEDURE — 36415 COLL VENOUS BLD VENIPUNCTURE: CPT

## 2022-10-16 PROCEDURE — 74011000258 HC RX REV CODE- 258: Performed by: STUDENT IN AN ORGANIZED HEALTH CARE EDUCATION/TRAINING PROGRAM

## 2022-10-16 PROCEDURE — 74011250636 HC RX REV CODE- 250/636: Performed by: STUDENT IN AN ORGANIZED HEALTH CARE EDUCATION/TRAINING PROGRAM

## 2022-10-16 PROCEDURE — 85025 COMPLETE CBC W/AUTO DIFF WBC: CPT

## 2022-10-16 PROCEDURE — 74011000250 HC RX REV CODE- 250: Performed by: STUDENT IN AN ORGANIZED HEALTH CARE EDUCATION/TRAINING PROGRAM

## 2022-10-16 PROCEDURE — 87040 BLOOD CULTURE FOR BACTERIA: CPT

## 2022-10-16 PROCEDURE — 65270000029 HC RM PRIVATE

## 2022-10-16 PROCEDURE — 80053 COMPREHEN METABOLIC PANEL: CPT

## 2022-10-16 RX ORDER — POLYETHYLENE GLYCOL 3350 17 G/17G
17 POWDER, FOR SOLUTION ORAL DAILY PRN
Status: DISCONTINUED | OUTPATIENT
Start: 2022-10-16 | End: 2022-10-20 | Stop reason: HOSPADM

## 2022-10-16 RX ORDER — OXYCODONE HYDROCHLORIDE 5 MG/1
5 TABLET ORAL
Status: DISCONTINUED | OUTPATIENT
Start: 2022-10-16 | End: 2022-10-20 | Stop reason: HOSPADM

## 2022-10-16 RX ORDER — ACETAMINOPHEN 325 MG/1
650 TABLET ORAL
Status: DISCONTINUED | OUTPATIENT
Start: 2022-10-16 | End: 2022-10-20 | Stop reason: HOSPADM

## 2022-10-16 RX ORDER — SODIUM CHLORIDE 0.9 % (FLUSH) 0.9 %
5-40 SYRINGE (ML) INJECTION AS NEEDED
Status: DISCONTINUED | OUTPATIENT
Start: 2022-10-16 | End: 2022-10-20 | Stop reason: HOSPADM

## 2022-10-16 RX ORDER — ONDANSETRON 4 MG/1
4 TABLET, ORALLY DISINTEGRATING ORAL
Status: DISCONTINUED | OUTPATIENT
Start: 2022-10-16 | End: 2022-10-20 | Stop reason: HOSPADM

## 2022-10-16 RX ORDER — ACETAMINOPHEN 650 MG/1
650 SUPPOSITORY RECTAL
Status: DISCONTINUED | OUTPATIENT
Start: 2022-10-16 | End: 2022-10-20 | Stop reason: HOSPADM

## 2022-10-16 RX ORDER — ENOXAPARIN SODIUM 100 MG/ML
40 INJECTION SUBCUTANEOUS DAILY
Status: DISCONTINUED | OUTPATIENT
Start: 2022-10-17 | End: 2022-10-17

## 2022-10-16 RX ORDER — ONDANSETRON 2 MG/ML
4 INJECTION INTRAMUSCULAR; INTRAVENOUS
Status: DISCONTINUED | OUTPATIENT
Start: 2022-10-16 | End: 2022-10-20 | Stop reason: HOSPADM

## 2022-10-16 RX ORDER — SODIUM CHLORIDE 0.9 % (FLUSH) 0.9 %
5-40 SYRINGE (ML) INJECTION EVERY 8 HOURS
Status: DISCONTINUED | OUTPATIENT
Start: 2022-10-16 | End: 2022-10-20 | Stop reason: HOSPADM

## 2022-10-16 RX ADMIN — DOXYCYCLINE 100 MG: 100 INJECTION, POWDER, LYOPHILIZED, FOR SOLUTION INTRAVENOUS at 21:13

## 2022-10-16 RX ADMIN — SODIUM CHLORIDE, PRESERVATIVE FREE 10 ML: 5 INJECTION INTRAVENOUS at 23:58

## 2022-10-16 RX ADMIN — PIPERACILLIN AND TAZOBACTAM 3.38 G: 3; .375 INJECTION, POWDER, FOR SOLUTION INTRAVENOUS at 23:56

## 2022-10-16 NOTE — PROGRESS NOTES
Transition of Care Plan  RUR: n/a  DISPOSITION: The disposition plan is home with family assistance  F/U with PCP/Specialist    Transport: self        Reason for Admission:  cellulitis                      RUR Score:     n/a                Plan for utilizing home health:     not recommended      PCP: First and Last name:  Eneida Marcos NP     Name of Practice:    Are you a current patient: Yes/No:    Approximate date of last visit:    Can you participate in a virtual visit with your PCP:                     Current Advanced Directive/Advance Care Plan: Full Code      Healthcare Decision Maker:   Click here to complete [address] including selection of the Healthcare Decision Maker Relationship (ie \"Primary\")                             Transition of Care Plan:                      CRM spoke with patient's wife, introduced self, explained role, verified demographics, and offered assistance. Patient lives with his wife in a home with a ramp to enter. Patient is independent in his ADLs/IADLs and does not have any DME. Patient uses [address] [place] [address] on [facility] for his prescriptions. Care Management Interventions  PCP Verified by CM:  Yes  Palliative Care Criteria Met (RRAT>21 & CHF Dx)?: No  Mode of Transport at Discharge: Self  Transition of Care Consult (CM Consult): Discharge Planning  MyChart Signup: No  Discharge Durable Medical Equipment: No  Health Maintenance Reviewed: Yes  Physical Therapy Consult: No  Occupational Therapy Consult: No  Speech Therapy Consult: No  Support Systems: Spouse/Significant Other  Confirm Follow Up Transport: Self  [place] Resource Information Provided?: No  Discharge Location  Patient Expects to be Discharged to[de-identified] Home with family assistance    7:12 PM  ALBERTO Marie

## 2022-10-16 NOTE — H&P
Hospitalist Admission Note    NAME:  Kashmir Valle   :  1960   MRN:  [id number]     Date/Time:  10/16/2022 4:53 PM    Patient PCP: Lanette Crawford NP  ______________________________________________________________________  Given the patient's current clinical presentation, I have a high level of concern for decompensation if discharged from the emergency department. Complex decision making was performed, which includes reviewing the patient's available past medical records, laboratory results, and x-ray films. Assessment / Plan:  Severe L lower leg cellulitis  No DVT on Doppler here. Afebrile, WBC 6.6, vitals stable, no septic. Was in the [facility], lots of water activity. Failed outpatient PO abx with bactrim and keflex. - broad antibiotic coverage with Vanc and Zosyn. Added Doxycycline for Vibrio coverage given water exposure  - trend CBC  - follow blood cultures  - ID consulted    Hx of varicose veins  Recent ablation with Dr. Krystal Carranza last month. - no DVT, followup with Vascular as outpatient    Code Status: Full  Surrogate Decision Maker: Wife Jelena [phone]    DVT Prophylaxis: lovenox  GI Prophylaxis: not indicated  Disposition: general floor      Subjective:   CHIEF COMPLAINT: left leg swelling    HISTORY OF PRESENT ILLNESS:     Kashmir Valle is a 60 y.o.  male with varicose veins with recent ablation who presents with 5 days of left leg swelling. Patient notes notes that on 2022, he had laser ablation to L leg superficial veins with Vascular here (Dr Krystal Carranza). 2 weeks later, went to [facility]. Last Tuesday, patient noticed increased redness and swelling without pain in his left lower leg. Called a doctor here in the [address] [facility], prescribed augmentin, took for 1 day. Went to a clinic in [address] [place] [address] 2 days later, received Rocephin and tetanus shot for persistent erythema. Returned to that clinic the next day, then got another dose of Rocephin. Spoke to Vascular Surgery via telephone that day, was told to go to [facility] to an ER, underwent ultrasound of bilateral legs. States he did not have a blood clot, was prescribed keflex and bactrim. [address] home Saturday. Notes he is still having swelling and erythema with mild pain. No fevers, chills, nausea, vomiting, chest pain, diaphoresis, headache. No hematochezia, hematuria, melena. In THE [facility] ED, leg swelling present. Doppler neg for DVT. We were asked to admit for work up and evaluation of the above problems given failure of outpatient antibiotics. Past Medical History:   Diagnosis Date    Inguinal hernia     Spider veins     Varicose vein of leg         Past Surgical History:   Procedure Laterality Date    HX COLONOSCOPY      HX HERNIA REPAIR  2021    HX ORTHOPAEDIC  2008       Social History     Tobacco Use    Smoking status: Never    Smokeless tobacco: Never   Substance Use Topics    Alcohol use: Yes     Comment: 3-4 beers or rum & coke daily   Denies history of withdrawal. No seizures. No drug use. No family history on file. Mother - kidney issue  Father - prostate cancer    No Known Allergies       Prior to Admission medications    Medication Sig Start Date End Date Taking? Authorizing Provider   predniSONE (STERAPRED) 5 mg dose pack See administration instruction per 5mg dose pack 7/9/22   Cindy Sal NP   ascorbic acid, vitamin C, (Vitamin C) 500 mg tablet Take  by mouth. Provider, Historical   MEN'S MULTI-VITAMIN PO Take  by mouth. Provider, Historical       REVIEW OF SYSTEMS:     14 point ROS reviewed with patient and negative except per above.     Objective:   VITALS:    Visit Vitals  BP (!) 154/84 (BP 1 Location: Left upper arm, BP Patient Position: At rest)   Pulse 70   Temp 98 °F (36.7 °C)   Resp 18   Ht 6' 5\" (1.956 m)   Wt 116.2 kg (256 lb 2.8 oz)   SpO2 97%   BMI 30.38 kg/m²       PHYSICAL EXAM:  General:   No acute distress, Answering questions appropriately  HEENT: PERRL, EOMI, Anicteric sclera. MMM  Neck:   Supple, no LAD, no thyromegaly  Resp:    CTAB, non-labored, No wheezes or crackles  Cardio:  regular rate, normal rhythm, no murmurs, no JVD  GI:    Soft, Non-tender, Non-distended, Normal bowel sounds. Extremities:  Left leg with +2 pitting edema to the shin, erythema from ankle to knee, warm, mildly tender to palpation. No calf tenderness. R leg normal.  Skin:    Warm, Dry, Pink, Intact aside from Left leg described above  Neurologic:   Alert and Oriented x4, No focal defects  _____________________________________________________________________    Procedures: see electronic medical records for all procedures/Xrays and details which were not copied into this note but were reviewed prior to creation of Plan. LAB DATA REVIEWED:    Recent Results (from the past 24 hour(s))   CBC WITH AUTOMATED DIFF    Collection Time: 10/16/22  2:27 PM   Result Value Ref Range    WBC 6.6 4.1 - 11.1 K/uL    RBC 3.97 (L) 4.10 - 5.70 M/uL    HGB 13.4 12.1 - 17.0 g/dL    HCT 40.6 36.6 - 50.3 %    .3 (H) 80.0 - 99.0 FL    MCH 33.8 26.0 - 34.0 PG    MCHC 33.0 30.0 - 36.5 g/dL    RDW 12.0 11.5 - 14.5 %    PLATELET 143 450 - 240 K/uL    MPV 8.7 (L) 8.9 - 12.9 FL    NRBC 0.0 0  WBC    ABSOLUTE NRBC 0.00 0.00 - 0.01 K/uL    NEUTROPHILS 61 32 - 75 %    LYMPHOCYTES 16 12 - 49 %    MONOCYTES 18 (H) 5 - 13 %    EOSINOPHILS 3 0 - 7 %    BASOPHILS 1 0 - 1 %    IMMATURE GRANULOCYTES 1 (H) 0.0 - 0.5 %    ABS. NEUTROPHILS 4.1 1.8 - 8.0 K/UL    ABS. LYMPHOCYTES 1.0 0.8 - 3.5 K/UL    ABS. MONOCYTES 1.2 (H) 0.0 - 1.0 K/UL    ABS. EOSINOPHILS 0.2 0.0 - 0.4 K/UL    ABS. BASOPHILS 0.1 0.0 - 0.1 K/UL    ABS. IMM.  GRANS. 0.1 (H) 0.00 - 0.04 K/UL    DF AUTOMATED     METABOLIC PANEL, COMPREHENSIVE    Collection Time: 10/16/22  2:27 PM   Result Value Ref Range    Sodium 139 136 - 145 mmol/L    Potassium 3.9 3.5 - 5.1 mmol/L    Chloride 105 97 - 108 mmol/L    CO2 30 21 - 32 mmol/L    Anion gap 4 (L) 5 - 15 mmol/L    Glucose 103 (H) 65 - 100 mg/dL    BUN 19 6 - 20 MG/DL    Creatinine 1.12 0.70 - 1.30 MG/DL    BUN/Creatinine ratio 17 12 - 20      eGFR >60 >60 ml/min/1.73m2    Calcium 8.9 8.5 - 10.1 MG/DL    Bilirubin, total 0.5 0.2 - 1.0 MG/DL    ALT (SGPT) 31 12 - 78 U/L    AST (SGOT) 25 15 - 37 U/L    Alk. phosphatase 65 45 - 117 U/L    Protein, total 8.0 6.4 - 8.2 g/dL    Albumin 3.2 (L) 3.5 - 5.0 g/dL    Globulin 4.8 (H) 2.0 - 4.0 g/dL    A-G Ratio 0.7 (L) 1.1 - 2.2       LE Doppler Left  No evidence of deep vein thrombosis in the left lower extremity. >50% of visit spent in counseling and coordination of care.     Signed: Aishwarya Gaytan MD

## 2022-10-17 ENCOUNTER — APPOINTMENT (OUTPATIENT)
Dept: CT IMAGING | Age: 62
DRG: 603 | End: 2022-10-17
Attending: INTERNAL MEDICINE
Payer: OTHER GOVERNMENT

## 2022-10-17 LAB
ANION GAP SERPL CALC-SCNC: 8 MMOL/L (ref 5–15)
BASOPHILS # BLD: 0.1 K/UL (ref 0–0.1)
BASOPHILS NFR BLD: 1 % (ref 0–1)
BUN SERPL-MCNC: 15 MG/DL (ref 6–20)
BUN/CREAT SERPL: 15 (ref 12–20)
CALCIUM SERPL-MCNC: 8.4 MG/DL (ref 8.5–10.1)
CHLORIDE SERPL-SCNC: 106 MMOL/L (ref 97–108)
CO2 SERPL-SCNC: 24 MMOL/L (ref 21–32)
CREAT SERPL-MCNC: 1.01 MG/DL (ref 0.7–1.3)
DIFFERENTIAL METHOD BLD: ABNORMAL
EOSINOPHIL # BLD: 0.4 K/UL (ref 0–0.4)
EOSINOPHIL NFR BLD: 6 % (ref 0–7)
ERYTHROCYTE [DISTWIDTH] IN BLOOD BY AUTOMATED COUNT: 11.9 % (ref 11.5–14.5)
GLUCOSE SERPL-MCNC: 101 MG/DL (ref 65–100)
HCT VFR BLD AUTO: 37.4 % (ref 36.6–50.3)
HGB BLD-MCNC: 12.6 G/DL (ref 12.1–17)
IMM GRANULOCYTES # BLD AUTO: 0.1 K/UL (ref 0–0.04)
IMM GRANULOCYTES NFR BLD AUTO: 1 % (ref 0–0.5)
LYMPHOCYTES # BLD: 1.5 K/UL (ref 0.8–3.5)
LYMPHOCYTES NFR BLD: 26 % (ref 12–49)
MAGNESIUM SERPL-MCNC: 2.4 MG/DL (ref 1.6–2.4)
MCH RBC QN AUTO: 33 PG (ref 26–34)
MCHC RBC AUTO-ENTMCNC: 33.7 G/DL (ref 30–36.5)
MCV RBC AUTO: 97.9 FL (ref 80–99)
MONOCYTES # BLD: 0.8 K/UL (ref 0–1)
MONOCYTES NFR BLD: 14 % (ref 5–13)
NEUTS SEG # BLD: 3 K/UL (ref 1.8–8)
NEUTS SEG NFR BLD: 52 % (ref 32–75)
NRBC # BLD: 0 K/UL (ref 0–0.01)
NRBC BLD-RTO: 0 PER 100 WBC
PHOSPHATE SERPL-MCNC: 4 MG/DL (ref 2.6–4.7)
PLATELET # BLD AUTO: 315 K/UL (ref 150–400)
PMV BLD AUTO: 8.9 FL (ref 8.9–12.9)
POTASSIUM SERPL-SCNC: 4.3 MMOL/L (ref 3.5–5.1)
RBC # BLD AUTO: 3.82 M/UL (ref 4.1–5.7)
SODIUM SERPL-SCNC: 138 MMOL/L (ref 136–145)
WBC # BLD AUTO: 5.7 K/UL (ref 4.1–11.1)

## 2022-10-17 PROCEDURE — 84100 ASSAY OF PHOSPHORUS: CPT

## 2022-10-17 PROCEDURE — 74011000250 HC RX REV CODE- 250: Performed by: STUDENT IN AN ORGANIZED HEALTH CARE EDUCATION/TRAINING PROGRAM

## 2022-10-17 PROCEDURE — 80048 BASIC METABOLIC PNL TOTAL CA: CPT

## 2022-10-17 PROCEDURE — 74011250636 HC RX REV CODE- 250/636: Performed by: STUDENT IN AN ORGANIZED HEALTH CARE EDUCATION/TRAINING PROGRAM

## 2022-10-17 PROCEDURE — 65270000029 HC RM PRIVATE

## 2022-10-17 PROCEDURE — 99223 1ST HOSP IP/OBS HIGH 75: CPT | Performed by: INTERNAL MEDICINE

## 2022-10-17 PROCEDURE — 36415 COLL VENOUS BLD VENIPUNCTURE: CPT

## 2022-10-17 PROCEDURE — 83735 ASSAY OF MAGNESIUM: CPT

## 2022-10-17 PROCEDURE — 74011000636 HC RX REV CODE- 636: Performed by: STUDENT IN AN ORGANIZED HEALTH CARE EDUCATION/TRAINING PROGRAM

## 2022-10-17 PROCEDURE — 73701 CT LOWER EXTREMITY W/DYE: CPT

## 2022-10-17 PROCEDURE — 74011000258 HC RX REV CODE- 258: Performed by: STUDENT IN AN ORGANIZED HEALTH CARE EDUCATION/TRAINING PROGRAM

## 2022-10-17 PROCEDURE — 85025 COMPLETE CBC W/AUTO DIFF WBC: CPT

## 2022-10-17 RX ORDER — ENOXAPARIN SODIUM 100 MG/ML
30 INJECTION SUBCUTANEOUS EVERY 12 HOURS
Status: DISCONTINUED | OUTPATIENT
Start: 2022-10-17 | End: 2022-10-17

## 2022-10-17 RX ORDER — ENOXAPARIN SODIUM 100 MG/ML
30 INJECTION SUBCUTANEOUS EVERY 12 HOURS
Status: DISCONTINUED | OUTPATIENT
Start: 2022-10-17 | End: 2022-10-20 | Stop reason: HOSPADM

## 2022-10-17 RX ADMIN — IOPAMIDOL 100 ML: 755 INJECTION, SOLUTION INTRAVENOUS at 12:15

## 2022-10-17 RX ADMIN — ENOXAPARIN SODIUM 30 MG: 100 INJECTION SUBCUTANEOUS at 22:24

## 2022-10-17 RX ADMIN — PIPERACILLIN AND TAZOBACTAM 3.38 G: 3; .375 INJECTION, POWDER, FOR SOLUTION INTRAVENOUS at 18:11

## 2022-10-17 RX ADMIN — SODIUM CHLORIDE, PRESERVATIVE FREE 10 ML: 5 INJECTION INTRAVENOUS at 06:11

## 2022-10-17 RX ADMIN — ENOXAPARIN SODIUM 30 MG: 100 INJECTION SUBCUTANEOUS at 13:13

## 2022-10-17 RX ADMIN — SODIUM CHLORIDE, PRESERVATIVE FREE 10 ML: 5 INJECTION INTRAVENOUS at 16:59

## 2022-10-17 RX ADMIN — DOXYCYCLINE 100 MG: 100 INJECTION, POWDER, LYOPHILIZED, FOR SOLUTION INTRAVENOUS at 13:12

## 2022-10-17 RX ADMIN — VANCOMYCIN HYDROCHLORIDE 2500 MG: 10 INJECTION, POWDER, LYOPHILIZED, FOR SOLUTION INTRAVENOUS at 03:55

## 2022-10-17 RX ADMIN — PIPERACILLIN AND TAZOBACTAM 3.38 G: 3; .375 INJECTION, POWDER, FOR SOLUTION INTRAVENOUS at 06:53

## 2022-10-17 RX ADMIN — VANCOMYCIN HYDROCHLORIDE 1000 MG: 1 INJECTION, POWDER, LYOPHILIZED, FOR SOLUTION INTRAVENOUS at 16:53

## 2022-10-17 NOTE — PROGRESS NOTES
Hospitalist Progress Note    NAME: Manuel Du   :  1960   MRN:  [id number]            Subjective:     Chief Complaint / Reason for Physician Visit  Discussed with RN events overnight. Patient reports that his leg hurts when he stands on it. Otherwise patient is feeling well. Review of Systems:  Symptom Y/N Comments  Symptom Y/N Comments   Fever/Chills n   Chest Pain n    Poor Appetite    Edema     Cough n   Abdominal Pain n    Sputum    Joint Pain     SOB/BENTLEY n   Pruritis/Rash     Nausea/vomit n   Tolerating PT/OT     Diarrhea n   Tolerating Diet     Constipation n   Other       Could NOT obtain due to:      Objective:     VITALS:   Last 24hrs VS reviewed since prior progress note. Most recent are:  Patient Vitals for the past 24 hrs:   Temp Pulse Resp BP SpO2   10/17/22 0735 98.4 °F (36.9 °C) 65 16 133/83 96 %   10/17/22 0406 98.8 °F (37.1 °C) 71 16 (!) 146/94 99 %   10/16/22 1941 98.8 °F (37.1 °C) (!) 58 16 (!) 152/96 96 %     No intake or output data in the 24 hours ending 10/17/22 1414     PHYSICAL EXAM:  General: WD, WN. Alert, cooperative, no acute distress    EENT:  EOMI. Anicteric sclerae. MMM  Resp:  CTA bilaterally, no wheezing or rales. No accessory muscle use  CV:  Regular  rhythm,  No edema  GI:  Soft, Non distended, Non tender. +Bowel sounds  Neurologic:  Alert and oriented X 3, normal speech,   Psych:   Good insight. Not anxious nor agitated  Skin:  No rashes. No jaundice. Left leg with +2 pitting edema to the shin, erythema from ankle to knee, warm, mildly tender to palpation. No calf tenderness. R leg normal. Erythema receeding     Procedures: see electronic medical records for all procedures/Xrays and details which were not copied into this note but were reviewed prior to creation of Plan. LABS:  I reviewed today's most current labs and imaging studies.   Pertinent labs include:  Recent Labs     10/17/22  0445 10/16/22  1427   WBC 5.7 6.6   HGB 12.6 13.4   HCT 37.4 40.6  320     Recent Labs     10/17/22  0445 10/16/22  1427    139   K 4.3 3.9    105   CO2 24 30   * 103*   BUN 15 19   CREA 1.01 1.12   CA 8.4* 8.9   MG 2.4  --    PHOS 4.0  --    ALB  --  3.2*   TBILI  --  0.5   ALT  --  31       Signed: Denise Sun MD        Reviewed most current lab test results and cultures  YES  Reviewed most current radiology test results   YES  Review and summation of old records today    NO  Reviewed patient's current orders and MAR    YES  PMH/SH reviewed - no change compared to H&P      Assessment / Plan:  Severe L lower leg cellulitis  No DVT on Doppler here. Afebrile, WBC 6.6, vitals stable, no septic. Was in the [place], lots of water activity. Failed outpatient PO abx with bactrim and keflex. 10/17/22 CT without evidence of abscess. Plan  - broad antibiotic coverage with Vanc and Zosyn. Added Doxycycline for Vibrio coverage given water exposure  - trend CBC  - follow blood cultures  - ID consulted, recs appreciated      Hx of varicose veins  Recent ablation with Dr. Krystal Carranza last month. - no DVT, followup with Vascular as outpatient      30.0 - 39.9 Obese / Body mass index is 30.38 kg/m². Code status: Full  Prophylaxis: Lovenox  Recommended Disposition: Home w/Family     ________________________________________________________________________  Care Plan discussed with:    Comments   Patient x    Family      RN     Care Manager     Consultant                        Multidiciplinary team rounds were held today with , nursing, pharmacist and clinical coordinator. Patient's plan of care was discussed; medications were reviewed and discharge planning was addressed.      ________________________________________________________________________  Total NON critical care TIME:  35   Minutes    Total CRITICAL CARE TIME Spent:   Minutes non procedure based      Comments   >50% of visit spent in counseling and coordination of care ________________________________________________________________________  Merlin Sandoval, MD

## 2022-10-17 NOTE — PROGRESS NOTES
Pharmacy Antimicrobial Kinetic Dosing    Indication for Antimicrobials: SSTI     Current Regimen of Each Antimicrobial:  Vancomycin 2000 mg IV them pharmacy to dose  Start Date 10-16; Day # 1 of 5)  Zosyn 3.375 gm IV q 8h    Start Date 10-16; Day # 1 of 5)  Doxycycline 100mg IV q 12h   Start Date 10-16; Day # 1)    Previous Antimicrobial Therapy:      Goal Level: VANCOMYCIN TROUGH GOAL RANGE    Vancomycin Trough: 10 - 15 mcg/mL AUC < 500    Date Dose & Interval Measured (mcg/mL) Predicted AUC/LEANNE                       Date & time of next level:24-48 hrs    Dosing calculator used: [employer] calculator    Significant Positive Cultures:   10-16  Blood = pending    Conditions for Dosing Consideration: None    Labs:  Recent Labs     10/16/22  1427   CREA 1.12   BUN 19     Recent Labs     10/16/22  1427   WBC 6.6     Temp (24hrs), Av.4 °F (36.9 °C), Min:98 °F (36.7 °C), Max:98.8 °F (37.1 °C)      Creatinine Clearance (mL/min):   CrCl (Ideal Body Weight): 85.1   If actual weight < IBW: CrCl (Actual Body Weight) 111.0    Impression/Plan:   Will continue with Vancomycin 1000 mg IV q 12h for AUC < 500 and trough 10-15 mcg/ml  Daily BMP per Vancomycin dosing protocol   Antimicrobial stop date: Vanco and Zosyn= 5 days, Doxycycline = to be determined     Pharmacy will follow daily and adjust medications as appropriate for renal function and/or serum levels.     Thank you,  Christina Swan, [facility]

## 2022-10-17 NOTE — PROGRESS NOTES
Bedside shift change report given to Maurilio Riddle RN (oncoming nurse) by Davis Lou LPN (offgoing nurse). Report included the following information SBAR, Kardex, and MAR.

## 2022-10-17 NOTE — ED PROVIDER NOTES
EMERGENCY DEPARTMENT HISTORY AND PHYSICAL EXAM      Date: 10/16/2022  Patient Name: Kashmir Valle    History of Presenting Illness     Chief Complaint   Patient presents with    Leg Swelling     Reports worsening lower left leg swelling, redness, and pain x 5 days. Recently dx with cellulitis and was given abx. Hx  of blood clots. Denied SOB or chest pain. History Provided By: Patient    HPI: Kashmir Valle, 60 y.o. male with a past medical history significant for varicose veins s/p recent ablation presents to the ED with cc of left leg swelling, erythema. Reports that previously he was in the [facility] recently after his varicose vein ablation. A few days after being there he noticed that he developed swelling and redness of his left lower extremity. Tender to palpation. Patient was seen by the emergency department in the [facility] and was given ceftriaxone and serial visits multiple days in a row. He is also started on p.o. antibiotics. He felt he was safe to fly back to the emergency department as he did not have any blood clots. He reports that his symptoms have been progressively worsening despite antibiotics. Denies any fever, chills, confusion. There are no associated symptoms. No other exacerbating or ameliorating factors. PCP: Lanette Crawford NP    No current facility-administered medications on file prior to encounter. Current Outpatient Medications on File Prior to Encounter   Medication Sig Dispense Refill    ascorbic acid, vitamin C, (VITAMIN C) 500 mg tablet Take  by mouth.       MEN'S MULTI-VITAMIN PO Take  by mouth.      predniSONE (STERAPRED) 5 mg dose pack See administration instruction per 5mg dose pack (Patient not taking: Reported on 10/16/2022) 21 Tablet 0       Past History     Past Medical History:  Past Medical History:   Diagnosis Date    Inguinal hernia     Spider veins     Varicose vein of leg        Past Surgical History:  Past Surgical History:   Procedure Laterality Date    HX COLONOSCOPY      HX HERNIA REPAIR  2021    HX ORTHOPAEDIC  2008       Family History:  No family history on file. Social History:  Social History     Tobacco Use    Smoking status: Never    Smokeless tobacco: Never   Substance Use Topics    Alcohol use: Yes     Comment: 3-4 beers or rum & coke daily    Drug use: Never       Allergies:  No Known Allergies      Review of Systems   Review of Systems   Constitutional:  Negative for appetite change. HENT:  Negative for sore throat. Eyes:  Negative for visual disturbance. Respiratory:  Negative for cough and shortness of breath. Cardiovascular:  Positive for leg swelling. Negative for chest pain. Gastrointestinal:  Negative for abdominal pain, diarrhea, nausea and vomiting. Genitourinary:  Negative for difficulty urinating. Musculoskeletal:  Negative for myalgias. Leg swelling, redness, pain   Skin:  Negative for color change. Neurological:  Negative for dizziness and headaches. Psychiatric/Behavioral:  Negative for agitation. Physical Exam   Physical Exam  Constitutional:       Appearance: Normal appearance. HENT:      Head: Normocephalic and atraumatic. Mouth/Throat:      Mouth: Mucous membranes are moist.   Eyes:      Conjunctiva/sclera: Conjunctivae normal.      Pupils: Pupils are equal, round, and reactive to light. Cardiovascular:      Rate and Rhythm: Normal rate and regular rhythm. Pulmonary:      Effort: Pulmonary effort is normal.      Breath sounds: Normal breath sounds. Abdominal:      General: Abdomen is flat. There is no distension. Palpations: Abdomen is soft. Tenderness: There is no guarding or rebound. Musculoskeletal:         General: No swelling. Normal range of motion. Cervical back: Normal range of motion.       Comments: Left leg erythema, edema to knee, tenderness  Small surgical wound with associated smaller area of increased erythema  2+ left DP pulse   Skin:     General: Skin is warm and dry. Capillary Refill: Capillary refill takes less than 2 seconds. Neurological:      General: No focal deficit present. Mental Status: He is alert and oriented to person, place, and time. Psychiatric:         Mood and Affect: Mood normal.       Diagnostic Study Results     Labs -     Recent Results (from the past 24 hour(s))   CBC WITH AUTOMATED DIFF    Collection Time: 10/16/22  2:27 PM   Result Value Ref Range    WBC 6.6 4.1 - 11.1 K/uL    RBC 3.97 (L) 4.10 - 5.70 M/uL    HGB 13.4 12.1 - 17.0 g/dL    HCT 40.6 36.6 - 50.3 %    .3 (H) 80.0 - 99.0 FL    MCH 33.8 26.0 - 34.0 PG    MCHC 33.0 30.0 - 36.5 g/dL    RDW 12.0 11.5 - 14.5 %    PLATELET 554 176 - 645 K/uL    MPV 8.7 (L) 8.9 - 12.9 FL    NRBC 0.0 0  WBC    ABSOLUTE NRBC 0.00 0.00 - 0.01 K/uL    NEUTROPHILS 61 32 - 75 %    LYMPHOCYTES 16 12 - 49 %    MONOCYTES 18 (H) 5 - 13 %    EOSINOPHILS 3 0 - 7 %    BASOPHILS 1 0 - 1 %    IMMATURE GRANULOCYTES 1 (H) 0.0 - 0.5 %    ABS. NEUTROPHILS 4.1 1.8 - 8.0 K/UL    ABS. LYMPHOCYTES 1.0 0.8 - 3.5 K/UL    ABS. MONOCYTES 1.2 (H) 0.0 - 1.0 K/UL    ABS. EOSINOPHILS 0.2 0.0 - 0.4 K/UL    ABS. BASOPHILS 0.1 0.0 - 0.1 K/UL    ABS. IMM. GRANS. 0.1 (H) 0.00 - 0.04 K/UL    DF AUTOMATED     METABOLIC PANEL, COMPREHENSIVE    Collection Time: 10/16/22  2:27 PM   Result Value Ref Range    Sodium 139 136 - 145 mmol/L    Potassium 3.9 3.5 - 5.1 mmol/L    Chloride 105 97 - 108 mmol/L    CO2 30 21 - 32 mmol/L    Anion gap 4 (L) 5 - 15 mmol/L    Glucose 103 (H) 65 - 100 mg/dL    BUN 19 6 - 20 MG/DL    Creatinine 1.12 0.70 - 1.30 MG/DL    BUN/Creatinine ratio 17 12 - 20      eGFR >60 >60 ml/min/1.73m2    Calcium 8.9 8.5 - 10.1 MG/DL    Bilirubin, total 0.5 0.2 - 1.0 MG/DL    ALT (SGPT) 31 12 - 78 U/L    AST (SGOT) 25 15 - 37 U/L    Alk.  phosphatase 65 45 - 117 U/L    Protein, total 8.0 6.4 - 8.2 g/dL    Albumin 3.2 (L) 3.5 - 5.0 g/dL    Globulin 4.8 (H) 2.0 - 4.0 g/dL    A-G Ratio 0.7 (L) 1.1 - 2.2         Radiologic Studies -   DUPLEX LOWER EXT VENOUS LEFT   Final Result        CT Results  (Last 48 hours)      None          CXR Results  (Last 48 hours)      None              Medical Decision Making   I am the first provider for this patient. I reviewed the vital signs, available nursing notes, past medical history, past surgical history, family history and social history. Vital Signs-Reviewed the patient's vital signs. Patient Vitals for the past 12 hrs:   Temp Pulse Resp BP SpO2   10/16/22 1941 98.8 °F (37.1 °C) (!) 58 16 (!) 152/96 96 %       Records Reviewed: Nursing records and medical records reviewed    Provider Notes (Medical Decision Making):   Patient presents with acute left leg cellulitis that is worsening despite p.o. antibiotic therapy. We will treat with IV vancomycin times IV Zosyn, and IV doxycycline. IV doxycycline added for concerns for possible vibrio infection. Doubt neck Fash with no crepitus on exam and nontoxic appearance of the patient. Patient does not meet criteria for sepsis. Ultrasound was obtained in was negative for any blood clots. Ultrasound technician spoke that she also evaluated for abscess and did not see any. Patient admitted to hospital medicine for further mangement. ED Course:   Initial assessment performed. The patients presenting problems have been discussed, and they are in agreement with the care plan formulated and outlined with them. I have encouraged them to ask questions as they arise throughout their visit. ED Course as of 10/17/22 0157   Sun Oct 16, 2022   1618 Abx with IV vancomycin, IV zosyn, and IV doxycycline ordered. Ordered non-vasc US of LE.   Admitted to medicine  [WB]      ED Course User Index  [WB] Oliver Guzman MD       Medications Administered       doxycycline (VIBRAMYCIN) 100 mg in 0.9% sodium chloride (MBP/ADV) 100 mL MBP       Admin Date  10/16/2022 Action  New Bag Dose  100 mg Rate  100 mL/hr Route  IntraVENous Administered By  Kevin Orourke RN              piperacillin-tazobactam (ZOSYN) 3.375 g in 0.9% sodium chloride (MBP/ADV) 100 mL MBP       Admin Date  10/16/2022 Action  New Bag Dose  3.375 g Rate  25 mL/hr Route  IntraVENous Administered By  Kevin Orourke RN              sodium chloride (NS) flush 5-40 mL       Admin Date  10/16/2022 Action  Given Dose  10 mL Route  IntraVENous Administered By  Kevin Orourke RN                    Critical Care:  None      Disposition:  Admission    Diagnosis     Clinical Impression:   1. Cellulitis of left lower extremity        Attestations:    Trent Dumont MD    Please note that this dictation was completed with [employer], the computer voice recognition software. Quite often unanticipated grammatical, syntax, homophones, and other interpretive errors are inadvertently transcribed by the computer software. Please disregard these errors. Please excuse any errors that have escaped final proofreading. Thank you.

## 2022-10-17 NOTE — CONSULTS
Infectious Disease Consult    Date of Consultation:  October 17, 2022  Reason for Consultation:left leg cellulitis, aquatic exposure  Referring Physician: Mendel  Date of Admission:10/16/22    Impression   LLE cellulitis   Recent h[place] [address]   Then increase in swelling , erythema , warmth   H/o varicose vein ablation on 9/27   H/o chronic issues LLE per history    No esosinophilia, leucocytosis   No h/o penetrating injury of foot or leg    No fresh or brackish water exposure   No h/o fever, chills. Significant improvement with iv antibiotics    H/o varicose veins  S/p ablation 9/27    Plan  Continue Vancomycin, Zosyn, Doxycycline  Patient is doing well on above antimicrobials. Continue same. Possible infections to consider inclusive but not limited to the following: Nonhuman schistosomes, Edwardsiella, , Thena Bevels, M. Marinum, nocardia, Leptospira, Erysipelothrix,  Shewenella, vibrio vulnificus, staff aureus. Elevate  LLE  BC x 2 if fever 100.5 or >  CT LLE -Diffuse soft tissue edema and swelling, compatible with  cellulitis. No rim-enhancing fluid collection to suggest abscess. ID service to  follow. Nilton Dias is a  60 y.o. male with a past medical history significant for varicose veins s/p recent ablation who presented to the ED with cc   of left leg swelling, erythema. Reports that previously he was in the [address] recently after his varicose vein ablation. A few days after   being there he noticed that he developed swelling and redness of his left lower extremity. Per H&P tender to palpation. Patient was seen by the   emergency department in the [facility] and was given ceftriaxone and serial visits multiple days in a row. He was also started on p.o. antibiotics. He felt he was safe to fly back to the ED as he did not have any blood clots. Per H&P pt reported that his symptoms had progressively worsened despite   antibiotics.  Pt denied fever,chills, confusion. ID service has been asked to see pt   Pt seen today. Feels much better. Area of erythema (marked by ink ) has receded. Swelling also less . Pt advised to keep LLE elevated. CT ordered. Report as follows: FINDINGS: Bones:  No fracture, dislocation, or periosteal reaction. Joint fluid: No significant joint effusion. Articulations: Moderate to severe tricompartmental knee osteoarthritis, most  pronounced anteriorly     Tendons: No full-thickness tendon tear. Muscles: No intramuscular hematoma. Medial head gastrocnemius focal atrophy. Soft tissues: Diffuse soft tissue edema and swelling, compatible with  cellulitis. No rim-enhancing fluid collection to suggest abscess. IMPRESSION  Cellulitis without evidence of abscess. Active Hospital Problems    Diagnosis Date Noted    Cellulitis 10/16/2022         Past Medical History:   Diagnosis Date    Inguinal hernia     Spider veins     Varicose vein of leg        Past Surgical History:   Procedure Laterality Date    HX COLONOSCOPY      HX HERNIA REPAIR  2021    HX ORTHOPAEDIC  2008       No Known Allergies    Social Connections: Not on file       No family status information on file. Medication Documentation Review Audit    **Prior to Admission medications have not yet been reviewed for this encounter**           Review of Systems - Negative except those mentioned in H&P      PHYSICAL EXAM:  General:          Awake, cooperative, no acute distress    EENT:              EOMI. Anicteric sclerae. MMM  Resp:               CTA bilaterally, no wheezing or rales. No accessory muscle use  CV:                  Regular  rhythm,  No edema  GI:                   Soft, Non distended, Non tender. +Bowel sounds  Neurologic:      Alert and oriented X 3, normal speech,   Psych:             Good insight. Not anxious nor agitated  Skin:                No rashes. No jaundice.   Extremity:LLE swelling+ , erythema+ , line of demarcation extends further out.         Cecilio Carmona MD [place]

## 2022-10-18 LAB
ANION GAP SERPL CALC-SCNC: 7 MMOL/L (ref 5–15)
BASOPHILS # BLD: 0.1 K/UL (ref 0–0.1)
BASOPHILS NFR BLD: 1 % (ref 0–1)
BUN SERPL-MCNC: 13 MG/DL (ref 6–20)
BUN/CREAT SERPL: 13 (ref 12–20)
CALCIUM SERPL-MCNC: 8.7 MG/DL (ref 8.5–10.1)
CHLORIDE SERPL-SCNC: 107 MMOL/L (ref 97–108)
CO2 SERPL-SCNC: 26 MMOL/L (ref 21–32)
CREAT SERPL-MCNC: 1.01 MG/DL (ref 0.7–1.3)
DIFFERENTIAL METHOD BLD: ABNORMAL
EOSINOPHIL # BLD: 0.4 K/UL (ref 0–0.4)
EOSINOPHIL NFR BLD: 7 % (ref 0–7)
ERYTHROCYTE [DISTWIDTH] IN BLOOD BY AUTOMATED COUNT: 11.7 % (ref 11.5–14.5)
GLUCOSE SERPL-MCNC: 105 MG/DL (ref 65–100)
HCT VFR BLD AUTO: 37.9 % (ref 36.6–50.3)
HGB BLD-MCNC: 13 G/DL (ref 12.1–17)
IMM GRANULOCYTES # BLD AUTO: 0.1 K/UL (ref 0–0.04)
IMM GRANULOCYTES NFR BLD AUTO: 1 % (ref 0–0.5)
LYMPHOCYTES # BLD: 1.5 K/UL (ref 0.8–3.5)
LYMPHOCYTES NFR BLD: 26 % (ref 12–49)
MAGNESIUM SERPL-MCNC: 2.5 MG/DL (ref 1.6–2.4)
MCH RBC QN AUTO: 33.6 PG (ref 26–34)
MCHC RBC AUTO-ENTMCNC: 34.3 G/DL (ref 30–36.5)
MCV RBC AUTO: 97.9 FL (ref 80–99)
MONOCYTES # BLD: 0.7 K/UL (ref 0–1)
MONOCYTES NFR BLD: 13 % (ref 5–13)
NEUTS SEG # BLD: 3 K/UL (ref 1.8–8)
NEUTS SEG NFR BLD: 52 % (ref 32–75)
NRBC # BLD: 0 K/UL (ref 0–0.01)
NRBC BLD-RTO: 0 PER 100 WBC
PHOSPHATE SERPL-MCNC: 3.7 MG/DL (ref 2.6–4.7)
PLATELET # BLD AUTO: 321 K/UL (ref 150–400)
PMV BLD AUTO: 8.7 FL (ref 8.9–12.9)
POTASSIUM SERPL-SCNC: 4.1 MMOL/L (ref 3.5–5.1)
RBC # BLD AUTO: 3.87 M/UL (ref 4.1–5.7)
SODIUM SERPL-SCNC: 140 MMOL/L (ref 136–145)
VANCOMYCIN SERPL-MCNC: 8.8 UG/ML
WBC # BLD AUTO: 5.8 K/UL (ref 4.1–11.1)

## 2022-10-18 PROCEDURE — 99233 SBSQ HOSP IP/OBS HIGH 50: CPT | Performed by: INTERNAL MEDICINE

## 2022-10-18 PROCEDURE — 84100 ASSAY OF PHOSPHORUS: CPT

## 2022-10-18 PROCEDURE — 83735 ASSAY OF MAGNESIUM: CPT

## 2022-10-18 PROCEDURE — 80202 ASSAY OF VANCOMYCIN: CPT

## 2022-10-18 PROCEDURE — 74011000250 HC RX REV CODE- 250: Performed by: STUDENT IN AN ORGANIZED HEALTH CARE EDUCATION/TRAINING PROGRAM

## 2022-10-18 PROCEDURE — 36415 COLL VENOUS BLD VENIPUNCTURE: CPT

## 2022-10-18 PROCEDURE — 65270000029 HC RM PRIVATE

## 2022-10-18 PROCEDURE — 74011250636 HC RX REV CODE- 250/636: Performed by: STUDENT IN AN ORGANIZED HEALTH CARE EDUCATION/TRAINING PROGRAM

## 2022-10-18 PROCEDURE — 74011000258 HC RX REV CODE- 258: Performed by: STUDENT IN AN ORGANIZED HEALTH CARE EDUCATION/TRAINING PROGRAM

## 2022-10-18 PROCEDURE — 85025 COMPLETE CBC W/AUTO DIFF WBC: CPT

## 2022-10-18 PROCEDURE — 80048 BASIC METABOLIC PNL TOTAL CA: CPT

## 2022-10-18 RX ORDER — VANCOMYCIN HYDROCHLORIDE
1250 EVERY 12 HOURS
Status: DISCONTINUED | OUTPATIENT
Start: 2022-10-18 | End: 2022-10-20

## 2022-10-18 RX ADMIN — ENOXAPARIN SODIUM 30 MG: 100 INJECTION SUBCUTANEOUS at 22:13

## 2022-10-18 RX ADMIN — DOXYCYCLINE 100 MG: 100 INJECTION, POWDER, LYOPHILIZED, FOR SOLUTION INTRAVENOUS at 08:54

## 2022-10-18 RX ADMIN — PIPERACILLIN AND TAZOBACTAM 3.38 G: 3; .375 INJECTION, POWDER, FOR SOLUTION INTRAVENOUS at 10:25

## 2022-10-18 RX ADMIN — PIPERACILLIN AND TAZOBACTAM 3.38 G: 3; .375 INJECTION, POWDER, FOR SOLUTION INTRAVENOUS at 18:00

## 2022-10-18 RX ADMIN — SODIUM CHLORIDE, PRESERVATIVE FREE 10 ML: 5 INJECTION INTRAVENOUS at 18:00

## 2022-10-18 RX ADMIN — SODIUM CHLORIDE, PRESERVATIVE FREE 10 ML: 5 INJECTION INTRAVENOUS at 05:46

## 2022-10-18 RX ADMIN — VANCOMYCIN HYDROCHLORIDE 1250 MG: 10 INJECTION, POWDER, LYOPHILIZED, FOR SOLUTION INTRAVENOUS at 15:04

## 2022-10-18 RX ADMIN — SODIUM CHLORIDE, PRESERVATIVE FREE 10 ML: 5 INJECTION INTRAVENOUS at 00:12

## 2022-10-18 RX ADMIN — SODIUM CHLORIDE, PRESERVATIVE FREE 10 ML: 5 INJECTION INTRAVENOUS at 22:00

## 2022-10-18 RX ADMIN — DOXYCYCLINE 100 MG: 100 INJECTION, POWDER, LYOPHILIZED, FOR SOLUTION INTRAVENOUS at 00:12

## 2022-10-18 RX ADMIN — DOXYCYCLINE 100 MG: 100 INJECTION, POWDER, LYOPHILIZED, FOR SOLUTION INTRAVENOUS at 22:13

## 2022-10-18 RX ADMIN — ENOXAPARIN SODIUM 30 MG: 100 INJECTION SUBCUTANEOUS at 10:26

## 2022-10-18 RX ADMIN — VANCOMYCIN HYDROCHLORIDE 1000 MG: 1 INJECTION, POWDER, LYOPHILIZED, FOR SOLUTION INTRAVENOUS at 05:38

## 2022-10-18 RX ADMIN — PIPERACILLIN AND TAZOBACTAM 3.38 G: 3; .375 INJECTION, POWDER, FOR SOLUTION INTRAVENOUS at 01:26

## 2022-10-18 NOTE — PROGRESS NOTES
Bedside and Verbal shift change report given to [address] [place] [address] (oncoming nurse) by Jhon Young (offgoing nurse). Report included the following information SBAR, Kardex, Intake/Output, MAR, and Med Rec Status.

## 2022-10-18 NOTE — PROGRESS NOTES
Spiritual Care Partner Volunteer visited patient at [address] in [facility] 2 MED TELE on 10/18/2022   Documented by:     JEFRY Cutler, [facility], Staff [address]    [address] Paging Service  [phone])

## 2022-10-18 NOTE — PROGRESS NOTES
Hospitalist Progress Note    NAME: Reji Evans   :  1960   MRN:  [id number]            Subjective:     Chief Complaint / Reason for Physician Visit  Discussed with RN events overnight. Patient reports he still has some pain with standing. Otherwise patient is feeling well, no fever, chills, CP. Review of Systems:  Symptom Y/N Comments  Symptom Y/N Comments   Fever/Chills n   Chest Pain n    Poor Appetite    Edema     Cough n   Abdominal Pain n    Sputum    Joint Pain     SOB/BENTLEY n   Pruritis/Rash     Nausea/vomit n   Tolerating PT/OT     Diarrhea n   Tolerating Diet     Constipation n   Other       Could NOT obtain due to:      Objective:     VITALS:   Last 24hrs VS reviewed since prior progress note. Most recent are:  Patient Vitals for the past 24 hrs:   Temp Pulse Resp BP SpO2   10/18/22 1110 97.5 °F (36.4 °C) 62 16 134/79 97 %   10/18/22 0815 -- -- -- -- 97 %   10/18/22 0645 98.1 °F (36.7 °C) 62 16 (!) 145/89 97 %   10/17/22 2200 98.6 °F (37 °C) 60 18 (!) 147/82 96 %   10/17/22 1541 98.2 °F (36.8 °C) (!) 58 16 138/85 98 %       No intake or output data in the 24 hours ending 10/18/22 1307     PHYSICAL EXAM:  General: WD, WN. Alert, cooperative, no acute distress    EENT:  EOMI. Anicteric sclerae. MMM  Resp:  CTA bilaterally, no wheezing or rales. No accessory muscle use  CV:  Regular  rhythm,  No edema  GI:  Soft, Non distended, Non tender. +Bowel sounds  Neurologic:  Alert and oriented X 3, normal speech,   Psych:   Good insight. Not anxious nor agitated  Skin:  No rashes. No jaundice. Left leg with +2 pitting edema to the shin, erythema from ankle to knee, warm, mildly tender to palpation. No calf tenderness. R leg normal. Erythema receeding     Procedures: see electronic medical records for all procedures/Xrays and details which were not copied into this note but were reviewed prior to creation of Plan. LABS:  I reviewed today's most current labs and imaging studies.   Pertinent labs include:  Recent Labs     10/18/22  0318 10/17/22  0445 10/16/22  1427   WBC 5.8 5.7 6.6   HGB 13.0 12.6 13.4   HCT 37.9 37.4 40.6    315 320       Recent Labs     10/18/22  0318 10/17/22  0445 10/16/22  1427    138 139   K 4.1 4.3 3.9    106 105   CO2 26 24 30   * 101* 103*   BUN 13 15 19   CREA 1.01 1.01 1.12   CA 8.7 8.4* 8.9   MG 2.5* 2.4  --    PHOS 3.7 4.0  --    ALB  --   --  3.2*   TBILI  --   --  0.5   ALT  --   --  31         Signed: Jerri Jane MD        Reviewed most current lab test results and cultures  YES  Reviewed most current radiology test results   YES  Review and summation of old records today    NO  Reviewed patient's current orders and MAR    YES  PMH/SH reviewed - no change compared to H&P      Assessment / Plan:  Severe L lower leg cellulitis  No DVT on Doppler here. Afebrile, WBC 6.6, vitals stable, no septic. Was in the [place], lots of water activity. Failed outpatient PO abx with bactrim and keflex. Blood cultures with no growth to date. 10/17/22 CT without evidence of abscess. Plan  - broad antibiotic coverage with Vanc and Zosyn. Added Doxycycline for Vibrio coverage given water exposure  - trend CBC  - follow blood cultures  - ID consulted, recs appreciated      Hx of varicose veins  Recent ablation with Dr. Lavinia Cardoza last month. - no DVT, followup with Vascular as outpatient      30.0 - 39.9 Obese / Body mass index is 30.38 kg/m². Code status: Full  Prophylaxis: Lovenox  Recommended Disposition: Home w/Family     ________________________________________________________________________  Care Plan discussed with:    Comments   Patient x    Family      RN     Care Manager     Consultant                        Multidiciplinary team rounds were held today with , nursing, pharmacist and clinical coordinator. Patient's plan of care was discussed; medications were reviewed and discharge planning was addressed. ________________________________________________________________________  Total NON critical care TIME:  35   Minutes    Total CRITICAL CARE TIME Spent:   Minutes non procedure based      Comments   >50% of visit spent in counseling and coordination of care     ________________________________________________________________________  Elle Cano MD

## 2022-10-18 NOTE — PROGRESS NOTES
Pharmacy Antimicrobial Kinetic Dosing    Indication for Antimicrobials: SSTI     Current Regimen of Each Antimicrobial:  Vancomycin 2000 mg IV them pharmacy to dose  Start Date 10-16; Day # 3 of 5)  Zosyn 3.375 gm IV q 8h    Start Date 10-16; Day # 3 of 5)  Doxycycline 100mg IV q 12h   Start Date 10-16; Day # 3)    Previous Antimicrobial Therapy:      Goal Level: VANCOMYCIN TROUGH GOAL RANGE    Vancomycin Trough: 10 - 15 mcg/mL AUC < 500    Date Dose & Interval Measured (mcg/mL) Predicted AUC/LEANNE   10/18 1000 mg IV Q12H 8.8 367                 Date & time of next level:24-48 hrs    Dosing calculator used: [employer] calculator    Significant Positive Cultures:   10-16  Blood = NG    Conditions for Dosing Consideration: None    Labs:  Recent Labs     10/18/22  0318 10/17/22  0445 10/16/22  1427   CREA 1.01 1.01 1.12   BUN 13 15 19     Recent Labs     10/18/22  0318 10/17/22  0445 10/16/22  1427   WBC 5.8 5.7 6.6     Temp (24hrs), Av.3 °F (36.8 °C), Min:98.1 °F (36.7 °C), Max:98.6 °F (37 °C)      Creatinine Clearance (mL/min):   CrCl (Ideal Body Weight): 94.3   If actual weight < IBW: CrCl (Actual Body Weight) 123.0    Impression/Plan:   Increase vancomycin dose to 1250 mg IV Q12H for project AUC of 455-505  Continue Zosyn and doxycycline   Antimicrobial stop date: Vanco and Zosyn= 5 days, Doxycycline = to be determined     Pharmacy will follow daily and adjust medications as appropriate for renal function and/or serum levels.     Thank you,  Maru Wynn PHARMD    Vancomycin Dosing Document    Documents located on pharmacy Teams site: Clinical Practice -> Antimicrobial Stewardship -> Antibiotics_Vancomycin     Aminoglycoside Dosing Document    Documents located on pharmacy Teams site: Clinical Practice -> Antimicrobial Stewardship -> Antibiotics_Aminoglycosides

## 2022-10-18 NOTE — PROGRESS NOTES
Problem: Patient Education: Go to Patient Education Activity  Goal: Patient/Family Education  Outcome: Progressing Towards Goal     Problem: General Medical Care Plan  Goal: *Progressive mobility and function (eg: ADL's)  Outcome: Progressing Towards Goal

## 2022-10-18 NOTE — PROGRESS NOTES
Infectious Disease Progress      Impression   LLE cellulitis   Recent h[place] [address] x 3 days   Then increase in swelling, erythema, warmth   H/o varicose vein ablation on 9/27   H/o chronic issues LLE per history    No esosinophilia, leucocytosis   No h/o penetrating injury of foot or leg  No walking on sand bare footed   No fresh or brackish water exposure   No h/o fever, chills. Significant improvement with iv antibiotics    H/o varicose veins  S/p ablation 9/27  Localized erythema , induration around incision site. Plan  Continue Vancomycin, Zosyn, Doxycycline  Patient is doing well on above antimicrobials. Continue same. Monitor localized area of erythema, induration  Possible infections to consider inclusive but not limited to the following:  schistosomes, Edwardsiella, , [id number]  [facility], M. Marinum, nocardia, Leptospira, Erysipelothrix,  Shewenella, vibrio vulnificus, staff aureus. Elevate  LLE  BC x 2 if fever 100.5 or >  CT LLE -Diffuse soft tissue edema and swelling, compatible with  cellulitis. No rim-enhancing fluid collection to suggest abscess. Freddy Marina is a  60 y.o. male with a past medical history significant for varicose veins s/p recent ablation who presented to the ED with cc   of left leg swelling, erythema. Reports that previously he was in the [address] recently after his varicose vein ablation. A few days after   being there he noticed that he developed swelling and redness of his left lower extremity. Per H&P tender to palpation. Patient was seen by the   emergency department in the [facility] and was given ceftriaxone and serial visits multiple days in a row. He was also started on p.o. antibiotics. He felt he was safe to fly back to the ED as he did not have any blood clots. Per H&P pt reported that his symptoms had progressively worsened despite   antibiotics. Pt denied fever,chills, confusion.    ID service has been asked to see pt   Pt seen today. Feels much better. Area of erythema (marked by ink ) has receded. Swelling also less . Localized area of erythema, induration noted at incision site today. Pt advised to keep LLE elevated. CT ordered. Report as follows: FINDINGS: Bones:  No fracture, dislocation, or periosteal reaction. Joint fluid: No significant joint effusion. Articulations: Moderate to severe tricompartmental knee osteoarthritis, most  pronounced anteriorly     Tendons: No full-thickness tendon tear. Muscles: No intramuscular hematoma. Medial head gastrocnemius focal atrophy. Soft tissues: Diffuse soft tissue edema and swelling, compatible with  cellulitis. No rim-enhancing fluid collection to suggest abscess. IMPRESSION  Cellulitis without evidence of abscess. Active Hospital Problems    Diagnosis Date Noted    Cellulitis 10/16/2022         Past Medical History:   Diagnosis Date    Inguinal hernia     Spider veins     Varicose vein of leg        Past Surgical History:   Procedure Laterality Date    HX COLONOSCOPY      HX HERNIA REPAIR  2021    HX ORTHOPAEDIC  2008       No Known Allergies    Social Connections: Not on file       No family status information on file. Medication Documentation Review Audit    **Prior to Admission medications have not yet been reviewed for this encounter**           Review of Systems - Negative except those mentioned in H&P      PHYSICAL EXAM:  General:          Awake, cooperative, no acute distress    EENT:              EOMI. Anicteric sclerae. MMM  Resp:               CTA bilaterally, no wheezing or rales. No accessory muscle use  CV:                  Regular  rhythm,  No edema  GI:                   Soft, Non distended, Non tender. +Bowel sounds  Neurologic:      Alert and oriented X 3, normal speech,   Psych:             Good insight. Not anxious nor agitated  Skin:                No rashes. No jaundice.   Extremity:LLE swelling+ , erythema+ , line of demarcation extends further out.         Mariana Salcedo MD [place]

## 2022-10-19 LAB
ANION GAP SERPL CALC-SCNC: 6 MMOL/L (ref 5–15)
BASOPHILS # BLD: 0.1 K/UL (ref 0–0.1)
BASOPHILS NFR BLD: 1 % (ref 0–1)
BUN SERPL-MCNC: 14 MG/DL (ref 6–20)
BUN/CREAT SERPL: 13 (ref 12–20)
CALCIUM SERPL-MCNC: 9 MG/DL (ref 8.5–10.1)
CHLORIDE SERPL-SCNC: 107 MMOL/L (ref 97–108)
CO2 SERPL-SCNC: 25 MMOL/L (ref 21–32)
CREAT SERPL-MCNC: 1.05 MG/DL (ref 0.7–1.3)
DIFFERENTIAL METHOD BLD: ABNORMAL
EOSINOPHIL # BLD: 0.4 K/UL (ref 0–0.4)
EOSINOPHIL NFR BLD: 6 % (ref 0–7)
ERYTHROCYTE [DISTWIDTH] IN BLOOD BY AUTOMATED COUNT: 11.6 % (ref 11.5–14.5)
GLUCOSE SERPL-MCNC: 102 MG/DL (ref 65–100)
HCT VFR BLD AUTO: 39.2 % (ref 36.6–50.3)
HGB BLD-MCNC: 13.6 G/DL (ref 12.1–17)
IMM GRANULOCYTES # BLD AUTO: 0.1 K/UL (ref 0–0.04)
IMM GRANULOCYTES NFR BLD AUTO: 1 % (ref 0–0.5)
LYMPHOCYTES # BLD: 1.5 K/UL (ref 0.8–3.5)
LYMPHOCYTES NFR BLD: 22 % (ref 12–49)
MAGNESIUM SERPL-MCNC: 2.3 MG/DL (ref 1.6–2.4)
MCH RBC QN AUTO: 33.6 PG (ref 26–34)
MCHC RBC AUTO-ENTMCNC: 34.7 G/DL (ref 30–36.5)
MCV RBC AUTO: 96.8 FL (ref 80–99)
MONOCYTES # BLD: 0.9 K/UL (ref 0–1)
MONOCYTES NFR BLD: 12 % (ref 5–13)
NEUTS SEG # BLD: 3.9 K/UL (ref 1.8–8)
NEUTS SEG NFR BLD: 58 % (ref 32–75)
NRBC # BLD: 0 K/UL (ref 0–0.01)
NRBC BLD-RTO: 0 PER 100 WBC
PHOSPHATE SERPL-MCNC: 3.5 MG/DL (ref 2.6–4.7)
PLATELET # BLD AUTO: 353 K/UL (ref 150–400)
PMV BLD AUTO: 8.4 FL (ref 8.9–12.9)
POTASSIUM SERPL-SCNC: 4 MMOL/L (ref 3.5–5.1)
RBC # BLD AUTO: 4.05 M/UL (ref 4.1–5.7)
SODIUM SERPL-SCNC: 138 MMOL/L (ref 136–145)
WBC # BLD AUTO: 6.9 K/UL (ref 4.1–11.1)

## 2022-10-19 PROCEDURE — 74011250636 HC RX REV CODE- 250/636: Performed by: STUDENT IN AN ORGANIZED HEALTH CARE EDUCATION/TRAINING PROGRAM

## 2022-10-19 PROCEDURE — 36415 COLL VENOUS BLD VENIPUNCTURE: CPT

## 2022-10-19 PROCEDURE — 85025 COMPLETE CBC W/AUTO DIFF WBC: CPT

## 2022-10-19 PROCEDURE — 74011000250 HC RX REV CODE- 250: Performed by: STUDENT IN AN ORGANIZED HEALTH CARE EDUCATION/TRAINING PROGRAM

## 2022-10-19 PROCEDURE — 84100 ASSAY OF PHOSPHORUS: CPT

## 2022-10-19 PROCEDURE — 65270000029 HC RM PRIVATE

## 2022-10-19 PROCEDURE — 99233 SBSQ HOSP IP/OBS HIGH 50: CPT | Performed by: INTERNAL MEDICINE

## 2022-10-19 PROCEDURE — 74011250637 HC RX REV CODE- 250/637: Performed by: INTERNAL MEDICINE

## 2022-10-19 PROCEDURE — 80048 BASIC METABOLIC PNL TOTAL CA: CPT

## 2022-10-19 PROCEDURE — 83735 ASSAY OF MAGNESIUM: CPT

## 2022-10-19 PROCEDURE — 74011000258 HC RX REV CODE- 258: Performed by: STUDENT IN AN ORGANIZED HEALTH CARE EDUCATION/TRAINING PROGRAM

## 2022-10-19 RX ADMIN — VANCOMYCIN HYDROCHLORIDE 1250 MG: 10 INJECTION, POWDER, LYOPHILIZED, FOR SOLUTION INTRAVENOUS at 15:06

## 2022-10-19 RX ADMIN — ENOXAPARIN SODIUM 30 MG: 100 INJECTION SUBCUTANEOUS at 21:31

## 2022-10-19 RX ADMIN — VANCOMYCIN HYDROCHLORIDE 1250 MG: 10 INJECTION, POWDER, LYOPHILIZED, FOR SOLUTION INTRAVENOUS at 02:00

## 2022-10-19 RX ADMIN — DOXYCYCLINE 100 MG: 100 INJECTION, POWDER, LYOPHILIZED, FOR SOLUTION INTRAVENOUS at 10:35

## 2022-10-19 RX ADMIN — PIPERACILLIN AND TAZOBACTAM 3.38 G: 3; .375 INJECTION, POWDER, FOR SOLUTION INTRAVENOUS at 01:58

## 2022-10-19 RX ADMIN — PIPERACILLIN AND TAZOBACTAM 3.38 G: 3; .375 INJECTION, POWDER, FOR SOLUTION INTRAVENOUS at 09:10

## 2022-10-19 RX ADMIN — SODIUM CHLORIDE, PRESERVATIVE FREE 10 ML: 5 INJECTION INTRAVENOUS at 14:00

## 2022-10-19 RX ADMIN — PIPERACILLIN AND TAZOBACTAM 3.38 G: 3; .375 INJECTION, POWDER, FOR SOLUTION INTRAVENOUS at 18:12

## 2022-10-19 RX ADMIN — ENOXAPARIN SODIUM 30 MG: 100 INJECTION SUBCUTANEOUS at 09:09

## 2022-10-19 RX ADMIN — SODIUM CHLORIDE, PRESERVATIVE FREE 10 ML: 5 INJECTION INTRAVENOUS at 05:09

## 2022-10-19 RX ADMIN — SODIUM CHLORIDE, PRESERVATIVE FREE 10 ML: 5 INJECTION INTRAVENOUS at 22:00

## 2022-10-19 RX ADMIN — DOXYCYCLINE 100 MG: 100 INJECTION, POWDER, LYOPHILIZED, FOR SOLUTION INTRAVENOUS at 21:31

## 2022-10-19 RX ADMIN — Medication 1 CAPSULE: at 09:09

## 2022-10-19 NOTE — PROGRESS NOTES
Hospitalist Progress Note    NAME: Ophelia Thomas   :  1960   MRN:  [id number]       Assessment / Plan:  Severe L lower leg cellulitis    No DVT on Doppler here. Afebrile, WBC 6.6, vitals stable, no septic. Was in the [place], lots of water activity. Failed outpatient PO abx with bactrim and keflex. Blood cultures with no growth to date. 10/17/22 CT without evidence of abscess. Continue with Vanc and Zosyn. Added Doxycycline for Vibrio coverage given water exposure. Discussed with ID today, concerned about the localized bump, ?developing abscess. Recommend vascular surgery consult. Hx of varicose veins  Recent ablation with Dr. Ranjit White last month. No DVT, followup with Vascular as outpatient        30.0 - 39.9 Obese / Body mass index is 30.38 kg/m². Code status: Full  Prophylaxis: Lovenox  Recommended Disposition: Home w/Family    TYESHA: 10/20/2022  Barriers: ID clearance/ Vascular consult     Subjective:     Chief Complaint / Reason for Physician Visit  Follow up for Severe left leg cellulitis  Overall his erythema is better. Left leg bump hasnt resolved. Review of Systems:  Symptom Y/N Comments  Symptom Y/N Comments   Fever/Chills n   Chest Pain n    Poor Appetite n   Edema n    Cough    Abdominal Pain n    Sputum    Joint Pain     SOB/BENTLEY    Pruritis/Rash     Nausea/vomit    Tolerating PT/OT     Diarrhea    Tolerating Diet     Constipation    Other       Could NOT obtain due to:      Objective:     VITALS:   Last 24hrs VS reviewed since prior progress note.  Most recent are:  Patient Vitals for the past 24 hrs:   Temp Pulse Resp BP SpO2   10/19/22 1140 97.7 °F (36.5 °C) 60 18 137/82 97 %   10/19/22 0907 98.1 °F (36.7 °C) 61 17 136/80 96 %   10/18/22 2216 98.2 °F (36.8 °C) 67 17 131/78 97 %   10/18/22 2000 98.5 °F (36.9 °C) 62 18 (!) 138/91 95 %       Intake/Output Summary (Last 24 hours) at 10/19/2022 1608  Last data filed at 10/19/2022 0907  Gross per 24 hour   Intake 250 ml Output --   Net 250 ml        I had a face to face encounter and independently examined this patient on 10/19/2022, as outlined below:  PHYSICAL EXAM:  General: Awake, No acute distress  EENT:  EOMI. Anicteric sclerae. MMM  Resp:  CTA bilaterally, no wheezing or rales. No accessory muscle use  CV:  Regular  rhythm,  No edema  GI:  Soft, Non distended, Non tender. +Bowel sounds  Neurologic:  Alert and oriented X 3, normal speech,   Psych:   Not anxious nor agitated  Skin:  Left leg anterior aspect : 2 cm bump with erythema. Reviewed most current lab test results and cultures  YES  Reviewed most current radiology test results   YES  Review and summation of old records today    NO  Reviewed patient's current orders and MAR    YES  PMH/ reviewed - no change compared to H&P  ________________________________________________________________________  Care Plan discussed with:    Comments   Patient y    Family      RN y    Care Manager     Consultant                       y Multidiciplinary team rounds were held today with , nursing, pharmacist and clinical coordinator. Patient's plan of care was discussed; medications were reviewed and discharge planning was addressed. ________________________________________________________________________  Total NON critical care TIME:  36   Minutes    Total CRITICAL CARE TIME Spent:   Minutes non procedure based      Comments   >50% of visit spent in counseling and coordination of care     ________________________________________________________________________  Debi Fothergill, MD     Procedures: see electronic medical records for all procedures/Xrays and details which were not copied into this note but were reviewed prior to creation of Plan. LABS:  I reviewed today's most current labs and imaging studies.   Pertinent labs include:  Recent Labs     10/19/22  0243 10/18/22  0318 10/17/22  0445   WBC 6.9 5.8 5.7   HGB 13.6 13.0 12.6   HCT 39.2 37.9 37.4  321 315     Recent Labs     10/19/22  0243 10/18/22  0318 10/17/22  0445    140 138   K 4.0 4.1 4.3    107 106   CO2 25 26 24   * 105* 101*   BUN 14 13 15   CREA 1.05 1.01 1.01   CA 9.0 8.7 8.4*   MG 2.3 2.5* 2.4   PHOS 3.5 3.7 4.0       Signed: Nilam Arndt MD Skin normal color for race, warm, dry and intact. No evidence of rash.

## 2022-10-19 NOTE — PROGRESS NOTES
Transition of Care Plan:    RUR: 4%  Disposition: home with wife  Follow up appointments: PCP/Specialist  DME needed:none  Transportation at [address] or means to access home:     wife   IM Medicare Letter:n/a  Is patient a  and connected with the [place]? If yes, was [employer] transfer form completed and [facility] notified? Caregiver Contact: Tameka Sherman wife [phone]  Discharge Caregiver contacted prior to discharge? Per patient  Care Conference needed?:    no    Informed via IDR's that patient will go home on po antibiotics - no needs anticipated. TOREY Waldrop    Care Management Interventions  PCP Verified by CM:  Yes  Palliative Care Criteria Met (RRAT>21 & CHF Dx)?: No  Mode of Transport at Discharge: Self  Transition of Care Consult (CM Consult): Discharge Planning  MyChart Signup: No  Discharge Durable Medical Equipment: No  Health Maintenance Reviewed: Yes  Physical Therapy Consult: No  Occupational Therapy Consult: No  Speech Therapy Consult: No  Support Systems: Spouse/Significant Other  Confirm Follow Up Transport: Self   Resource Information Provided?: No  Discharge Location  Patient Expects to be Discharged to[de-identified] Home with family assistance

## 2022-10-19 NOTE — PROGRESS NOTES
Infectious Disease Progress      Impression   LLE cellulitis   [place] [address] x 3 days   Then increase in swelling, erythema, warmth   H/o varicose vein ablation on 9/27   H/o chronic issues LLE per history    No esosinophilia, leucocytosis   No h/o penetrating injury of foot or leg   No walking on sand bare footed   No fresh or brackish water exposure   No h/o fever, chills. Significant improvement with iv antibiotics    H/o varicose veins  S/p ablation 9/27  Localized erythema , induration around incision site persists   ( See photo below)      Plan  Continue Vancomycin, Zosyn, Doxycycline  Patient is doing well on above antimicrobials. Continue same. Monitor localized area of erythema, induration  Possible infections to consider inclusive but not limited to the following:  schistosomes, Edwardsiella, , [id number]  [facility], M. Marinum, nocardia, Leptospira, Erysipelothrix,  Shewenella, vibrio vulnificus, staff aureus. Elevate  LLE  BC x 2 if fever 100.5 or >  CT LLE -Diffuse soft tissue edema and swelling, compatible with  cellulitis. No rim-enhancing fluid collection to suggest abscess. Pt eager to be discharged home. Recommend Vascular evaluation prior to DC  DC planning on Cefddinir, levaquin Doxycycline po tomorrow if better. Pt seen today. Feels better. Localized area of erythema, induration noted. Denies pain. Soreness in LLE less. Active Hospital Problems    Diagnosis Date Noted    Cellulitis 10/16/2022         Past Medical History:   Diagnosis Date    Inguinal hernia     Spider veins     Varicose vein of leg        Past Surgical History:   Procedure Laterality Date    HX COLONOSCOPY      HX HERNIA REPAIR  2021    HX ORTHOPAEDIC  2008       No Known Allergies    Social Connections: Not on file       No family status information on file.        Medication Documentation Review Audit    **Prior to Admission medications have not yet been reviewed for this encounter** Review of Systems - Negative except those mentioned in H&P      PHYSICAL EXAM:  General:          Awake, cooperative, no acute distress    EENT:              EOMI. Anicteric sclerae. MMM  Resp:               CTA bilaterally, no wheezing or rales. No accessory muscle use  CV:                  Regular  rhythm,  No edema  GI:                   Soft, Non distended, Non tender. +Bowel sounds  Neurologic:      Alert and oriented X 3, normal speech,   Psych:             Good insight. Not anxious nor agitated  Skin:                No rashes. No jaundice. Extremity:LLE swelling+ , erythema+ , line of demarcation extends further out.           Gregoria Miller MD [place]

## 2022-10-19 NOTE — PROGRESS NOTES
Vascular    I'm shocked that he has been an inpatient since Sunday and I was not called until this afternoon when I was already out of the hospital.  I will see him first thing in the morning. NPO after MN in case he needs anything drained. If there is no need for drainage, I will feed him and clear him for discharge as soon as I have seen him.

## 2022-10-19 NOTE — CONSULTS
Consult acknowledged: Mr. Phyllis Ortiz is a well-known patient to our service. He is an avid hiker and is a non-smoker. He is status post a left GSV RFA 9/26/22. Following the procedure he went on vacation to the [facility] and per his own report was swimming. He wass admitted to the hospital with a left leg cellulitis on 10/16 we have been asked to evaluate. No need for urgent vascular procedural intervention. Formal consult to follow in the a.m.

## 2022-10-19 NOTE — PROGRESS NOTES
Hospital follow-up PCP transitional care appointment has been scheduled with Dr. Christel Silvestre on 10/24/22 at 1030. Pending patient discharge.   Valeri Adams, Care Management Assistant

## 2022-10-20 VITALS
HEIGHT: 77 IN | OXYGEN SATURATION: 98 % | BODY MASS INDEX: 30.25 KG/M2 | SYSTOLIC BLOOD PRESSURE: 134 MMHG | TEMPERATURE: 98.4 F | DIASTOLIC BLOOD PRESSURE: 72 MMHG | RESPIRATION RATE: 18 BRPM | WEIGHT: 256.17 LBS | HEART RATE: 60 BPM

## 2022-10-20 LAB
ANION GAP SERPL CALC-SCNC: 5 MMOL/L (ref 5–15)
BASOPHILS # BLD: 0.1 K/UL (ref 0–0.1)
BASOPHILS NFR BLD: 1 % (ref 0–1)
BUN SERPL-MCNC: 15 MG/DL (ref 6–20)
BUN/CREAT SERPL: 15 (ref 12–20)
CALCIUM SERPL-MCNC: 8.6 MG/DL (ref 8.5–10.1)
CHLORIDE SERPL-SCNC: 107 MMOL/L (ref 97–108)
CO2 SERPL-SCNC: 26 MMOL/L (ref 21–32)
CREAT SERPL-MCNC: 1.03 MG/DL (ref 0.7–1.3)
DIFFERENTIAL METHOD BLD: ABNORMAL
EOSINOPHIL # BLD: 0.5 K/UL (ref 0–0.4)
EOSINOPHIL NFR BLD: 6 % (ref 0–7)
ERYTHROCYTE [DISTWIDTH] IN BLOOD BY AUTOMATED COUNT: 11.6 % (ref 11.5–14.5)
GLUCOSE SERPL-MCNC: 102 MG/DL (ref 65–100)
HCT VFR BLD AUTO: 38.7 % (ref 36.6–50.3)
HGB BLD-MCNC: 13.5 G/DL (ref 12.1–17)
IMM GRANULOCYTES # BLD AUTO: 0.1 K/UL (ref 0–0.04)
IMM GRANULOCYTES NFR BLD AUTO: 1 % (ref 0–0.5)
LYMPHOCYTES # BLD: 2 K/UL (ref 0.8–3.5)
LYMPHOCYTES NFR BLD: 27 % (ref 12–49)
MAGNESIUM SERPL-MCNC: 2.3 MG/DL (ref 1.6–2.4)
MCH RBC QN AUTO: 33.8 PG (ref 26–34)
MCHC RBC AUTO-ENTMCNC: 34.9 G/DL (ref 30–36.5)
MCV RBC AUTO: 97 FL (ref 80–99)
MONOCYTES # BLD: 0.9 K/UL (ref 0–1)
MONOCYTES NFR BLD: 12 % (ref 5–13)
NEUTS SEG # BLD: 3.9 K/UL (ref 1.8–8)
NEUTS SEG NFR BLD: 53 % (ref 32–75)
NRBC # BLD: 0 K/UL (ref 0–0.01)
NRBC BLD-RTO: 0 PER 100 WBC
PHOSPHATE SERPL-MCNC: 3.9 MG/DL (ref 2.6–4.7)
PLATELET # BLD AUTO: 366 K/UL (ref 150–400)
PMV BLD AUTO: 8.6 FL (ref 8.9–12.9)
POTASSIUM SERPL-SCNC: 4.1 MMOL/L (ref 3.5–5.1)
RBC # BLD AUTO: 3.99 M/UL (ref 4.1–5.7)
SODIUM SERPL-SCNC: 138 MMOL/L (ref 136–145)
WBC # BLD AUTO: 7.5 K/UL (ref 4.1–11.1)

## 2022-10-20 PROCEDURE — 80048 BASIC METABOLIC PNL TOTAL CA: CPT

## 2022-10-20 PROCEDURE — 74011000250 HC RX REV CODE- 250: Performed by: STUDENT IN AN ORGANIZED HEALTH CARE EDUCATION/TRAINING PROGRAM

## 2022-10-20 PROCEDURE — 74011250636 HC RX REV CODE- 250/636: Performed by: STUDENT IN AN ORGANIZED HEALTH CARE EDUCATION/TRAINING PROGRAM

## 2022-10-20 PROCEDURE — 83735 ASSAY OF MAGNESIUM: CPT

## 2022-10-20 PROCEDURE — 74011250637 HC RX REV CODE- 250/637: Performed by: INTERNAL MEDICINE

## 2022-10-20 PROCEDURE — 84100 ASSAY OF PHOSPHORUS: CPT

## 2022-10-20 PROCEDURE — 85025 COMPLETE CBC W/AUTO DIFF WBC: CPT

## 2022-10-20 PROCEDURE — 74011000258 HC RX REV CODE- 258: Performed by: STUDENT IN AN ORGANIZED HEALTH CARE EDUCATION/TRAINING PROGRAM

## 2022-10-20 PROCEDURE — 36415 COLL VENOUS BLD VENIPUNCTURE: CPT

## 2022-10-20 RX ORDER — LEVOFLOXACIN 750 MG/1
750 TABLET ORAL DAILY
Qty: 5 TABLET | Refills: 0 | Status: SHIPPED | OUTPATIENT
Start: 2022-10-20 | End: 2022-10-25 | Stop reason: SDUPTHER

## 2022-10-20 RX ORDER — CEFDINIR 300 MG/1
300 CAPSULE ORAL 2 TIMES DAILY
Qty: 10 CAPSULE | Refills: 0 | Status: SHIPPED | OUTPATIENT
Start: 2022-10-20 | End: 2022-10-25 | Stop reason: SDUPTHER

## 2022-10-20 RX ORDER — DOXYCYCLINE 100 MG/1
100 TABLET ORAL 2 TIMES DAILY
Qty: 10 TABLET | Refills: 0 | Status: SHIPPED | OUTPATIENT
Start: 2022-10-20 | End: 2022-10-25 | Stop reason: SDUPTHER

## 2022-10-20 RX ORDER — OXYCODONE HYDROCHLORIDE 5 MG/1
5 TABLET ORAL
Qty: 6 TABLET | Refills: 0 | Status: SHIPPED | OUTPATIENT
Start: 2022-10-20 | End: 2022-10-22

## 2022-10-20 RX ADMIN — PIPERACILLIN AND TAZOBACTAM 3.38 G: 3; .375 INJECTION, POWDER, FOR SOLUTION INTRAVENOUS at 10:06

## 2022-10-20 RX ADMIN — DOXYCYCLINE 100 MG: 100 INJECTION, POWDER, LYOPHILIZED, FOR SOLUTION INTRAVENOUS at 08:46

## 2022-10-20 RX ADMIN — PIPERACILLIN AND TAZOBACTAM 3.38 G: 3; .375 INJECTION, POWDER, FOR SOLUTION INTRAVENOUS at 01:00

## 2022-10-20 RX ADMIN — VANCOMYCIN HYDROCHLORIDE 1250 MG: 10 INJECTION, POWDER, LYOPHILIZED, FOR SOLUTION INTRAVENOUS at 05:01

## 2022-10-20 RX ADMIN — Medication 1 CAPSULE: at 08:43

## 2022-10-20 RX ADMIN — SODIUM CHLORIDE, PRESERVATIVE FREE 10 ML: 5 INJECTION INTRAVENOUS at 06:00

## 2022-10-20 RX ADMIN — ENOXAPARIN SODIUM 30 MG: 100 INJECTION SUBCUTANEOUS at 10:06

## 2022-10-20 NOTE — DISCHARGE SUMMARY
Hospitalist Discharge Summary     Patient ID:  Ursula Zhou  [id number]  8 y.o.  1960  10/16/2022    PCP on record: Jp Fu NP    Admit date: 10/16/2022  Discharge date and time: 10/20/2022    DISCHARGE DIAGNOSIS:  Left leg cellulitis  Recent left leg varicose vein ablation      CONSULTATIONS:  IP CONSULT TO HOSPITALIST  IP CONSULT TO INFECTIOUS DISEASES  IP CONSULT TO VASCULAR SURGERY    Excerpted HPI from H&P of Chloe James MD:  Ursula Zhou is a 60 y.o.  male with varicose veins with recent ablation who presents with 5 days of left leg swelling. Patient notes notes that on Sept 26 2022, he had laser ablation to L leg superficial veins with Vascular here (Dr Jace Rubi). 2 weeks later, went to [facility]. Last Tuesday, patient noticed increased redness and swelling without pain in his left lower leg. Called a doctor here in the [address] [facility] [facility], prescribed augmentin, took for 1 day. Went to a clinic in [address] [place] [address] 2 days later, received Rocephin and tetanus shot for persistent erythema. Returned to that clinic the next day, then got another dose of Rocephin. Spoke to Vascular Surgery via telephone that day, was told to go to [facility] to an ER, underwent ultrasound of bilateral legs. States he did not have a blood clot, was prescribed keflex and bactrim. [address] home Saturday. Notes he is still having swelling and erythema with mild pain. No fevers, chills, nausea, vomiting, chest pain, diaphoresis, headache. No hematochezia, hematuria, melena. In THE [facility] ED, leg swelling present. Doppler neg for DVT. We were asked to admit for work up and evaluation of the above problems given failure of outpatient antibiotics. ______________________________________________________________________  DISCHARGE SUMMARY/HOSPITAL COURSE:  for full details see H&P, daily progress notes, labs, consult notes. Severe L lower leg cellulitis     No DVT on Doppler here.  Afebrile, WBC 6.6, vitals stable, no septic. Was in the [place], lots of water activity. Failed outpatient PO abx with bactrim and keflex. Blood cultures with no growth to date. 10/17/22 CT without evidence of abscess. Overall erythema much better except for the localized area just around his incision site (from recent varicose vein ablation). Seen by vascular surgery. Cleared for discharge on oral abx  Discharge on oral abx per ID recommendation: Cefdinir, Levaquin and Doxy to complete 10 day course     Hx of varicose veins  Recent ablation with Dr. Jahaira Vieira last month. No DVT, followup with Vascular as outpatient          _______________________________________________________________________  Patient seen and examined by me on discharge day. Pertinent Findings:  Gen:    Not in distress  Chest: Clear lungs  CVS:   Regular rhythm. No edema  Abd:  Soft, not distended, not tender  Neuro:  Alert,   _______________________________________________________________________  DISCHARGE MEDICATIONS:   Discharge Medication List as of 10/20/2022 12:31 PM        START taking these medications    Details   cefdinir (OMNICEF) 300 mg capsule Take 1 Capsule by mouth two (2) times a day for 5 days. , Normal, Disp-10 Capsule, R-0      levoFLOXacin (Levaquin) 750 mg tablet Take 1 Tablet by mouth daily for 5 days. , Normal, Disp-5 Tablet, R-0      doxycycline (ADOXA) 100 mg tablet Take 1 Tablet by mouth two (2) times a day for 5 days. , Normal, Disp-10 Tablet, R-0      L.acid,para-B. bifidum-S.therm (RISAQUAD) 8 billion cell cap cap Take 1 Capsule by mouth daily. , Normal, Disp-15 Capsule, R-0      oxyCODONE IR (ROXICODONE) 5 mg immediate release tablet Take 1 Tablet by mouth every eight (8) hours as needed for Pain for up to 2 days.  Max Daily Amount: 15 mg., Normal, Disp-6 Tablet, R-0           CONTINUE these medications which have NOT CHANGED    Details   ascorbic acid, vitamin C, (VITAMIN C) 500 mg tablet Take  by mouth., Historical Med MEN'S MULTI-VITAMIN PO Take  by mouth., Historical Med           STOP taking these medications       predniSONE (STERAPRED) 5 mg dose pack Comments:   Reason for Stopping:                 Patient Follow Up Instructions: Activity: Activity as tolerated  Diet: Regular Diet   Wound Care: None needed  Keep LLE elevated    Follow-up with     Follow-up Information       Follow up With Specialties Details Why Contact Idris Abbasi NP Family Medicine Go on 10/24/2022 at 10:30am for your PCP hospital follow up with Dr. Colleen Dow  [address] [facility] 7  [phone]            ________________________________________________________________    Risk of deterioration: Low    Condition at Discharge:  Stable  __________________________________________________________________    Disposition  Home with family, no needs    ____________________________________________________________________    Code Status: Full Code  ___________________________________________________________________      Total time in minutes spent coordinating this discharge (includes going over instructions, follow-up, prescriptions, and preparing report for sign off to her PCP) :  35 minutes    Signed:  Remigio Layton MD

## 2022-10-20 NOTE — PROGRESS NOTES
Problem: Patient Education: Go to Patient Education Activity  Goal: Patient/Family Education  Outcome: Resolved/Met     Problem: General Medical Care Plan  Goal: *Progressive mobility and function (eg: ADL's)  Outcome: Resolved/Met

## 2022-10-20 NOTE — DISCHARGE INSTRUCTIONS
HOSPITALIST DISCHARGE INSTRUCTIONS    NAME: Efrain River   :  1960   MRN:  [id number]     Date/Time:  10/20/2022 10:54 AM    ADMIT DATE: 10/16/2022     DISCHARGE DATE: 10/20/2022     DISCHARGE DIAGNOSIS:  Left lower extremity cellulitis  S/p Left varicose veins ablation    MEDICATIONS:  As per medication reconciliation  list  It is important that you take the medication exactly as they are prescribed. Keep your medication in the bottles provided by the pharmacist and keep a list of the medication names, dosages, and times to be taken in your wallet. Do not take other medications without consulting your doctor. Pain Management: per above medications    What to do at Home    Recommended diet:  Regular Diet    Recommended activity: Activity as tolerated  Keep Left Leg elevated when sitting/ lying    If you have questions regarding the hospital related prescriptions or hospital related issues please call at [phone]. If you experience any of the following symptoms then please call your primary care physician or return to the emergency room if you cannot get hold of your doctor: Worsening lower extremity redness/ swelling    Follow Up:  PMD in 1 week      Information obtained by :  I understand that if any problems occur once I am at home I am to contact my physician. I understand and acknowledge receipt of the instructions indicated above.                                                                                                                                            Physician's or R.N.'s Signature                                                                  Date/Time                                                                                                                                              Patient or Representative Signature                                                          Date/Time

## 2022-10-20 NOTE — PROGRESS NOTES
Patient discharged. Belongings discharged with patient home. IV removed. Discharge instructions given. Teach back received. No signs of discomfort or pain at this time.

## 2022-10-20 NOTE — CONSULTS
Vascular Surgery Consult Note  10/20/2022    Subjective:     Anisa Kenyon is a 60 y.o.  male who is well-known patient to our service. He is an avid hiker and is a non-smoker. He is status post a left GSV RFA 9/26/22. Following the procedure he went on vacation to the [facility] and per his own report was swimming, snorkeling, and diving. He developed left lower extremity erythema and edema. He was seen by provider in the [facility] and prescribed Augmentin by our office. He was admitted to the hospital with a left leg cellulitis on 10/16 we have been asked to evaluate. Past medical history  Varicosities    Past procedural history  Inguinal hernia repair 2021  Orthopedic surgery 2008  Left greater saphenous vein RFA 9/26/2022    Family history  Leukemia: Brother    Social history  He is a non-smoker. He occasionally drinks alcohol. He is a [place]. He is independent of his ADLs and IADLs. He is an avid hiker. Medications  Vitamin C 500 mg daily  Multivitamin daily    No Known Allergies     Review of Systems   Constitutional:  Negative for chills and fever. HENT:  Negative for congestion. Eyes:  Negative for visual disturbance. Respiratory:  Negative for cough and shortness of breath. Cardiovascular:  Positive for leg swelling. Negative for chest pain. Gastrointestinal:  Negative for nausea and vomiting. Endocrine: Negative for polydipsia and polyuria. Genitourinary: Negative. Skin:  Positive for color change. Negative for wound. Allergic/Immunologic: Negative for immunocompromised state. Neurological: Negative. Hematological: Negative. Psychiatric/Behavioral: Negative.        Objective:     Patient Vitals for the past 24 hrs:   BP Temp Pulse Resp SpO2   10/20/22 0815 134/72 98.4 °F (36.9 °C) 60 18 98 %   10/19/22 2219 133/84 98.2 °F (36.8 °C) 61 17 98 %   10/19/22 1624 (!) 144/91 98 °F (36.7 °C) (!) 56 18 97 %   10/19/22 1140 137/82 97.7 °F (36.5 °C) 60 18 97 %        Physical Exam  Constitutional:       Appearance: He is obese. HENT:      Head: Normocephalic. Nose: Nose normal.      Mouth/Throat:      Mouth: Mucous membranes are moist.   Eyes:      Pupils: Pupils are equal, round, and reactive to light. Cardiovascular:      Rate and Rhythm: Normal rate and regular rhythm. Pulmonary:      Effort: Pulmonary effort is normal. No respiratory distress. Abdominal:      General: There is no distension. Palpations: Abdomen is soft. Musculoskeletal:         General: Normal range of motion. Cervical back: Normal range of motion. Skin:     General: Skin is warm. Comments: Edema and ecchymosis of the left lower extremity. No obvious wound. No drainage. Neurological:      Mental Status: He is alert. Mental status is at baseline.        Pertinent Test Results:   Recent Results (from the past 24 hour(s))   METABOLIC PANEL, BASIC    Collection Time: 10/20/22  2:13 AM   Result Value Ref Range    Sodium 138 136 - 145 mmol/L    Potassium 4.1 3.5 - 5.1 mmol/L    Chloride 107 97 - 108 mmol/L    CO2 26 21 - 32 mmol/L    Anion gap 5 5 - 15 mmol/L    Glucose 102 (H) 65 - 100 mg/dL    BUN 15 6 - 20 MG/DL    Creatinine 1.03 0.70 - 1.30 MG/DL    BUN/Creatinine ratio 15 12 - 20      eGFR >60 >60 ml/min/1.73m2    Calcium 8.6 8.5 - 10.1 MG/DL   CBC WITH AUTOMATED DIFF    Collection Time: 10/20/22  2:13 AM   Result Value Ref Range    WBC 7.5 4.1 - 11.1 K/uL    RBC 3.99 (L) 4.10 - 5.70 M/uL    HGB 13.5 12.1 - 17.0 g/dL    HCT 38.7 36.6 - 50.3 %    MCV 97.0 80.0 - 99.0 FL    MCH 33.8 26.0 - 34.0 PG    MCHC 34.9 30.0 - 36.5 g/dL    RDW 11.6 11.5 - 14.5 %    PLATELET 183 805 - 775 K/uL    MPV 8.6 (L) 8.9 - 12.9 FL    NRBC 0.0 0  WBC    ABSOLUTE NRBC 0.00 0.00 - 0.01 K/uL    NEUTROPHILS 53 32 - 75 %    LYMPHOCYTES 27 12 - 49 %    MONOCYTES 12 5 - 13 %    EOSINOPHILS 6 0 - 7 %    BASOPHILS 1 0 - 1 %    IMMATURE GRANULOCYTES 1 (H) 0.0 - 0.5 %    ABS. NEUTROPHILS 3.9 1.8 - 8.0 K/UL    ABS. LYMPHOCYTES 2.0 0.8 - 3.5 K/UL    ABS. MONOCYTES 0.9 0.0 - 1.0 K/UL    ABS. EOSINOPHILS 0.5 (H) 0.0 - 0.4 K/UL    ABS. BASOPHILS 0.1 0.0 - 0.1 K/UL    ABS. IMM. GRANS. 0.1 (H) 0.00 - 0.04 K/UL    DF AUTOMATED     PHOSPHORUS    Collection Time: 10/20/22  2:13 AM   Result Value Ref Range    Phosphorus 3.9 2.6 - 4.7 MG/DL   MAGNESIUM    Collection Time: 10/20/22  2:13 AM   Result Value Ref Range    Magnesium 2.3 1.6 - 2.4 mg/dL       Assessmen/Plan:     Left lower extremity swelling and edema  Varicose veins with other complications  -Status post left leg GSV RFA 9/26/2022. Suspect this is secondary to postprocedural superficial phlebitis. It has improved since initial presentation. It is unclear if this was due to the antibiotics and he could possibly have an underlying cellulitis. Recommend discharge on oral antibiotics to complete a 10-day course, compression, and leg elevation. Patient is stable for discharge from a vascular standpoint. Patient will follow-up as an outpatient as previously scheduled.     VTE Prophylaxis:  LMWH    Disposition:  Home    Signed By: Reece Seay NP     October 20, 2022

## 2022-10-22 LAB
BACTERIA SPEC CULT: NORMAL
BACTERIA SPEC CULT: NORMAL
SERVICE CMNT-IMP: NORMAL
SERVICE CMNT-IMP: NORMAL

## 2022-10-24 ENCOUNTER — OFFICE VISIT (OUTPATIENT)
Dept: FAMILY MEDICINE CLINIC | Age: 62
End: 2022-10-24
Payer: OTHER GOVERNMENT

## 2022-10-24 VITALS
DIASTOLIC BLOOD PRESSURE: 88 MMHG | TEMPERATURE: 98.1 F | HEIGHT: 77 IN | RESPIRATION RATE: 16 BRPM | BODY MASS INDEX: 29.85 KG/M2 | SYSTOLIC BLOOD PRESSURE: 131 MMHG | WEIGHT: 252.8 LBS | OXYGEN SATURATION: 96 % | HEART RATE: 67 BPM

## 2022-10-24 DIAGNOSIS — L03.116 CELLULITIS OF LEFT LOWER EXTREMITY: ICD-10-CM

## 2022-10-24 DIAGNOSIS — Z09 HOSPITAL DISCHARGE FOLLOW-UP: Primary | ICD-10-CM

## 2022-10-24 PROCEDURE — 99495 TRANSJ CARE MGMT MOD F2F 14D: CPT

## 2022-10-24 PROCEDURE — 1111F DSCHRG MED/CURRENT MED MERGE: CPT

## 2022-10-24 NOTE — PROGRESS NOTES
Transitional Care Management Progress Note    Patient: Nellie Perez  : 1960  PCP: Jose Carlos Stovall NP    Date of office visit: 10/24/2022   Date of admission: 10/16/22  Date of discharge: 10/20/22  Hospital: [facility]    Call initiated w/i 2 business dates of discharge: *No response documented in the last 14 days   Date of the most recent call to the patient: *No documented post hospital discharge outreach found in the last 14 days        Assessment/Plan:   Diagnoses and all orders for this visit:    1. Hospital discharge follow-up  -     SC DISCHARGE MEDS RECONCILED W/ CURRENT OUTPATIENT MED LIST    2. Cellulitis of left lower extremity  - I am aware that the redness and swelling have significantly improved however I am still concerned that the infection is still present. I have consulted infectious disease who originally consulted on this patient to provide some feedback on if it is appropriate to continue antibiotic treatment for a few more days. - I will contact patient with decision to continue or not continue antibiotic therapy. - Patient is aware to go to ED if redness, swelling or pain worsen and or if he develops fevers. - Update: Infectious disease was consulted an they recommend extension of antibiotics. Will extend antibiotics for 7 days. Patient will follow-up in a week to reassess wound. Patient has vascular appointment on 10/31. We discussed the expected course, resolution and complications of the diagnosis(es) in detail. Medication risks, benefits, costs, interactions, and alternatives were discussed as indicated. I advised him to contact the office if his condition worsens, changes or fails to improve as anticipated. He expressed understanding with the diagnosis(es) and plan.      The complexity of medical decision making for this patient's transitional care is moderate     Medication Side Effects and Warnings were discussed with patient: yes  Patient Labs were reviewed: yes  Patient Past Records were reviewed:  yes    Verbal and written instructions (see AVS) provided. Patient expresses understanding and agreement of diagnosis and treatment plan. Follow-up and Dispositions    Return if symptoms worsen or fail to improve. Subjective:   Mercedes Alvarez is a 60 y.o. male presenting today for follow-up after hospital discharge. This encounter and supporting documentation was reviewed if available. Medication reconciliation was performed today. The main problem requiring admission was Cellulitis. Complications during admission: none    He has an appointment with vascular surgeon on Monday (10/31/2022)    Interval history/Current status:     Patient notes on Sept 26 2022, he had laser ablation to L leg superficial veins with Vascular (Dr Nuris Ko). 2 weeks later, he went to [facility] and went snorkeling, he noticed increased redness and swelling without pain in his left lower leg. Went to a clinic in the [facility] on 10/13/2022 where he received IV rocephin and a tetanus shot. Was told to return to the next day and when he went back to the clinic he was give an additional 2 doses of rocephin. Spoke to Vascular Surgery via telephone that day, was told to go to [facility] to an ER to confirm no DVT before flying back, underwent ultrasound of bilateral legs and confirmed that he did not have a DVT and he was prescribed keflex and bactrim. He flew back on 10/15/2022 and then went to [address] [place], [place] [address]as [facility] ER on 10/16/2022 due to continual swelling, erythema and pain with walking. He was then admitted that day and was given IV Vancomycin, Zosyn, Doxycycline throughout his stay. He was also consulted by infectious disease and vascular surgery during his admission. HE was then discharged on oral doxycyline, omnicef and levaquin. Since being discharged patient states that he no longer has pain in his leg.  The swelling and erythema is significantly better and has not changed since leaving the hospital.   No fevers, chills, nausea, vomiting, chest pain, SOB, diaphoresis, headache. No hematochezia, hematuria, melena. He will complete his antibiotic course on 10/25/2022. He has an appointment with vascular surgeon on Monday (10/31/2022)    Admitting symptoms have: improved      Medications marked \"taking\" at this time:  Prior to Admission medications    Medication Sig Start Date End Date Taking? Authorizing Provider   cefdinir (OMNICEF) 300 mg capsule Take 1 Capsule by mouth two (2) times a day for 5 days. 10/20/22 10/25/22 Yes Nichelle Ocasio MD   levoFLOXacin (Levaquin) 750 mg tablet Take 1 Tablet by mouth daily for 5 days. 10/20/22 10/25/22 Yes Nichelle Ocasio MD   doxycycline (ADOXA) 100 mg tablet Take 1 Tablet by mouth two (2) times a day for 5 days. 10/20/22 10/25/22 Yes Nichelle Ocasio MD   L.acid,para-B. bifidum-S.therm (RISAQUAD) 8 billion cell cap cap Take 1 Capsule by mouth daily. 10/21/22  Yes Nichelle Ocasio MD   ascorbic acid, vitamin C, (VITAMIN C) 500 mg tablet Take  by mouth. Yes Provider, Historical   MEN'S MULTI-VITAMIN PO Take  by mouth.    Yes Provider, Historical   COVID-19 mRNA Vacc (MODERNA) 100 mcg/0.5 mL susp injection  6/13/22 10/24/22  Provider, Historical        ROS:  History obtained from chart review and the patient  General ROS: negative for - chills, fatigue, fever, or malaise  Ophthalmic ROS: negative for - blurry vision  ENT ROS: negative for - headaches or visual changes  Respiratory ROS: no cough, shortness of breath, or wheezing  Cardiovascular ROS: no chest pain or dyspnea on exertion  Gastrointestinal ROS: no abdominal pain, change in bowel habits, or black or bloody stools  Genito-Urinary ROS: no dysuria, trouble voiding, or hematuria  Musculoskeletal ROS: positive for - swelling in leg - left  negative for - muscle pain, muscular weakness, or pain in leg - left  Neurological ROS: no TIA or stroke symptoms  Dermatological ROS: positive for - dry skin and erythema around the site of vascular surgery         Objective:   Visit Vitals  /88 (BP 1 Location: Left upper arm, BP Patient Position: Sitting, BP Cuff Size: Adult long)   Pulse 67   Temp 98.1 °F (36.7 °C) (Temporal)   Resp 16   Ht 6' 5\" (1.956 m)   Wt 252 lb 12.8 oz (114.7 kg)   SpO2 96%   BMI 29.98 kg/m²      Gen: alert, oriented, no acute distress  Ears: external auditory canals clear, TMs without erythema or effusion  Eyes: pupils equal round reactive to light, sclera clear, conjunctiva clear  Neck: thyroid symmetric and not enlarged, no carotid bruits, no jugular vein distention  Resp: no increase work of breathing, lungs clear to ausculation bilaterally, no wheezing, rales or rhonchi  CV: S1, S2 normal. No murmurs, rubs, or gallops. Skin: Erythema noted at the site of entry of vascular surgery and surrounding area as seen below. There is also a yellow pinpoint scab noted at entry site unsure if there is still underlying abscess. No drainage noted. +blanching. Noted that the redness was significantly reduced compared to admission based on old skin marking. Extremities: no cyanosis of bilateral legs. +edema on left calf-nonpitting.               Kwaku French NP

## 2022-10-24 NOTE — PROGRESS NOTES
1. \"Have you been to the ER, urgent care clinic since your last visit? Hospitalized since your last visit? \" Yes Where: [facility], on file for cellulitis. 10/16/2022    2. \"Have you seen or consulted any other health care providers outside of the [address] [place] [address] since your last visit? \" No     3. For patients aged 39-70: Has the patient had a colonoscopy / FIT/ Cologuard? Yes - Care Gap present. Most recent result on file A couple of years ago, pt states he may be almost due for another one. If the patient is female:    4. For patients aged 41-77: Has the patient had a mammogram within the past 2 years? NA - based on age or sex      11. For patients aged 21-65: Has the patient had a pap smear? NA - based on age or sex    Health Maintenance Due   Topic Date Due    Shingrix Vaccine Age 49> (1 of 2) Never done    Flu Vaccine (1) 08/01/2022    COVID-19 Vaccine (4 - Booster for Nena April series) 10/13/2022     Chief Complaint   Patient presents with    Hospital Follow Up     Cellulitis (left leg) follow up      Pt has questions in regards to his insurance company, pt wanting to know if an hearing test and eye exam is covered by his insurance. Pt states he hasn't had a vision screening for a few years now and his wife has concerns regarding his hearing.

## 2022-10-25 RX ORDER — CEFDINIR 300 MG/1
300 CAPSULE ORAL 2 TIMES DAILY
Qty: 15 CAPSULE | Refills: 0 | Status: SHIPPED | OUTPATIENT
Start: 2022-10-25 | End: 2022-11-02 | Stop reason: SDUPTHER

## 2022-10-25 RX ORDER — DOXYCYCLINE 100 MG/1
100 TABLET ORAL 2 TIMES DAILY
Qty: 15 TABLET | Refills: 0 | Status: SHIPPED | OUTPATIENT
Start: 2022-10-25 | End: 2022-11-02 | Stop reason: SDUPTHER

## 2022-10-25 RX ORDER — LEVOFLOXACIN 750 MG/1
750 TABLET ORAL DAILY
Qty: 7 TABLET | Refills: 0 | Status: SHIPPED | OUTPATIENT
Start: 2022-10-25 | End: 2022-11-02 | Stop reason: SDUPTHER

## 2022-10-25 NOTE — PROGRESS NOTES
Physician Progress Note      PATIENT:               Heidi Valencia  CSN #:                  [id number]  :                       1960  ADMIT DATE:       10/16/2022 2:47 PM  [address] DATE:        10/20/2022 1:06 PM  RESPONDING  PROVIDER #:        Mel Shi MD          QUERY TEXT:    Pt admitted with left leg cellulitis. Pt noted to have 2022, laser ablation to L leg superficial veins . If possible, please document in progress notes and discharge summary the relationship, if any, between cellulitis and recent laser ablation to L leg superficial veins . The medical record reflects the following:  Risk Factors: 2022, laser ablation to L leg superficial veins,  Failed PO abx with bactrim and keflex  Clinical Indicators: dcs-Left leg cellulitis, Recent left leg varicose vein ablation  Treatment: ID cx, Cefdinir, Levaquin and Doxy  Thanks  Denice Mansfield Rn/CDI   Options provided:  -- cellulitis due to laser ablation  -- cellulitis unrelated to laser ablation  -- Other - I will add my own diagnosis  -- Disagree - Not applicable / Not valid  -- Disagree - Clinically unable to determine / Unknown  -- Refer to Clinical Documentation Reviewer    PROVIDER RESPONSE TEXT:    This patient has cellulitis, unrelated to laser ablation .     Query created by: Anjelica Rogers on 10/25/2022 3:41 PM      Electronically signed by:  Mel Shi MD 10/25/2022 3:47 PM

## 2022-10-30 NOTE — PROGRESS NOTES
Anisa Kenyon is a 60 y.o. male  and presents with   Chief Complaint   Patient presents with    Skin Infection     Follow-up      Cellulitis follow-up:   Patient had an appointment with vascular surgeon on 1031/2022. Patient states that an ultrasound was done and everything looked good. Per patient vascular surgeon did not want to do any interventions to the leg at this time due to the infection. At last visit infectious diseases was consulted and requested that antibiotics be extended. Antibiotics were extended for another week. Patient has one more dose left. Denies fever, chills, malaise, pain, worsening erythema/edema, numbness/tingling, chest pain or SOB. Current Outpatient Medications on File Prior to Visit   Medication Sig Dispense Refill    cefdinir (OMNICEF) 300 mg capsule Take 1 Capsule by mouth two (2) times a day for 7 days. Take 1 capsule this afternoon then follow as directed. 15 Capsule 0    doxycycline (ADOXA) 100 mg tablet Take 1 Tablet by mouth two (2) times a day for 7 days. Please take 1 tablet this afternoon and then follow as directed. 15 Tablet 0    levoFLOXacin (Levaquin) 750 mg tablet Take 1 Tablet by mouth daily for 7 days. 7 Tablet 0    L.acid,para-B. bifidum-S.therm (RISAQUAD) 8 billion cell cap cap Take 1 Capsule by mouth daily. 15 Capsule 0    ascorbic acid, vitamin C, (VITAMIN C) 500 mg tablet Take  by mouth. MEN'S MULTI-VITAMIN PO Take  by mouth. No current facility-administered medications on file prior to visit.       Past Medical History:   Diagnosis Date    Inguinal hernia     Spider veins     Varicose vein of leg      Past Surgical History:   Procedure Laterality Date    HX COLONOSCOPY      HX HERNIA REPAIR  2021    HX ORTHOPAEDIC  2008    IR ABLATION VEIN EXT INITIAL  09/26/2022    Laser Ablation     Social History     Socioeconomic History    Marital status:      Spouse name: Not on file    Number of children: Not on file    Years of education: Not on file    Highest education level: Not on file   Occupational History    Not on file   Tobacco Use    Smoking status: Never    Smokeless tobacco: Never   Vaping Use    Vaping Use: Never used   Substance and Sexual Activity    Alcohol use: Yes     Comment: 3-4 beers or rum & coke daily    Drug use: Never    Sexual activity: Not on file   Other Topics Concern    Not on file   Social History Narrative    Not on file     Social Determinants of Health     Financial Resource Strain: Not on file   Food Insecurity: Not on file   Transportation Needs: Not on file   Physical Activity: Insufficiently Active    Days of Exercise per Week: 3 days    Minutes of Exercise per Session: 30 min   Stress: Not on file   Social Connections: Not on file   Intimate Partner Violence: Not At Risk    Fear of Current or Ex-Partner: No    Emotionally Abused: No    Physically Abused: No    Sexually Abused: No   Housing Stability: Not on file     No Known Allergies    Review of Systems   Constitutional:  Negative for chills, fever, malaise/fatigue and weight loss. Respiratory:  Negative for cough and shortness of breath. Cardiovascular:  Negative for chest pain, palpitations and leg swelling. Gastrointestinal:  Negative for abdominal pain, blood in stool, constipation, diarrhea, heartburn, nausea and vomiting. Genitourinary:  Negative for dysuria, frequency, hematuria and urgency. Skin:         Cellulitis of left lower extremity    Neurological:  Negative for dizziness, tingling, weakness and headaches. Physical Examination:    Visit Vitals  /85 (BP 1 Location: Left upper arm, BP Patient Position: Sitting, BP Cuff Size: Adult)   Pulse 66   Temp 98.2 °F (36.8 °C) (Temporal)   Resp 18   Ht 6' 5\" (1.956 m)   Wt 252 lb (114.3 kg)   SpO2 98%   BMI 29.88 kg/m²      Physical Exam  Vitals and nursing note reviewed. Constitutional:       General: He is not in acute distress. Appearance: Normal appearance.    HENT:      Head: Normocephalic. Eyes:      Conjunctiva/sclera: Conjunctivae normal.      Pupils: Pupils are equal, round, and reactive to light. Neck:      Thyroid: No thyromegaly or thyroid tenderness. Vascular: No carotid bruit. Cardiovascular:      Rate and Rhythm: Normal rate and regular rhythm. Pulses: Normal pulses. Popliteal pulses are 2+ on the right side and 2+ on the left side. Dorsalis pedis pulses are 2+ on the right side and 2+ on the left side. Posterior tibial pulses are 2+ on the right side and 2+ on the left side. Heart sounds: Normal heart sounds. Pulmonary:      Effort: Pulmonary effort is normal. No respiratory distress. Breath sounds: Normal breath sounds. Musculoskeletal:      Cervical back: No tenderness. Right lower leg: Normal.      Left lower leg: No tenderness. Comments: Slight edema around site of infection. No pitting edema. Lymphadenopathy:      Cervical: No cervical adenopathy. Comments: No lymphadenopathy noted. Skin:     General: Skin is warm and dry. Comments: Slight erythema noted at the site of entry of vascular surgery and surrounding area as seen below. This is significantly improved from previous visit. There is serous drainage noted this time at entry site. +blanching. Dry, flaking skin noted on entire left lower leg. Neurological:      General: No focal deficit present. Mental Status: He is alert and oriented to person, place, and time. Mental status is at baseline. Psychiatric:         Mood and Affect: Mood normal.         Behavior: Behavior normal.         Thought Content: Thought content normal.         Judgment: Judgment normal.            Assessment/Plan:  Differential diagnosis and treatment options reviewed with patient who is in agreement with treatment plan as outlined below. ICD-10-CM ICD-9-CM    1.  Cellulitis of left lower extremity  L03.116 682.6         Erythema and swelling have significantly improved since last visit. I have consulted ID for their thoughts on the infection. Will contact patient once ID has responded with recommendations. Patient is aware to go to ED if redness, swelling or pain worsen and or if he develops fevers. - Update: Infectious disease was consulted an they recommend extension of antibiotics. Will extend antibiotics for 7 days. Patient will follow-up in a week to reassess wound. Medication Side Effects and Warnings were discussed with patient: yes  Patient Labs were reviewed: yes  Patient Past Records were reviewed:  yes    Verbal and written instructions (see AVS) provided. Patient expresses understanding and agreement of diagnosis and treatment plan.     Reta Puentes NP

## 2022-11-01 ENCOUNTER — OFFICE VISIT (OUTPATIENT)
Dept: FAMILY MEDICINE CLINIC | Age: 62
End: 2022-11-01
Payer: OTHER GOVERNMENT

## 2022-11-01 VITALS
HEIGHT: 77 IN | DIASTOLIC BLOOD PRESSURE: 85 MMHG | SYSTOLIC BLOOD PRESSURE: 131 MMHG | TEMPERATURE: 98.2 F | OXYGEN SATURATION: 98 % | RESPIRATION RATE: 18 BRPM | WEIGHT: 252 LBS | HEART RATE: 66 BPM | BODY MASS INDEX: 29.76 KG/M2

## 2022-11-01 DIAGNOSIS — L03.116 CELLULITIS OF LEFT LOWER EXTREMITY: Primary | ICD-10-CM

## 2022-11-01 PROCEDURE — 99213 OFFICE O/P EST LOW 20 MIN: CPT

## 2022-11-01 NOTE — PROGRESS NOTES
Chief Complaint   Patient presents with    Skin Infection     Follow-up      Health Maintenance reviewed     1. Have you been to the ER, urgent care clinic since your last visit? Hospitalized since your last visit? No    2. Have you seen or consulted any other health care providers outside of the [address] [place] [address] since your last visit? Include any pap smears or colon screening.   No

## 2022-11-02 ENCOUNTER — TELEPHONE (OUTPATIENT)
Dept: FAMILY MEDICINE CLINIC | Age: 62
End: 2022-11-02

## 2022-11-02 DIAGNOSIS — L03.116 CELLULITIS OF LEFT LOWER EXTREMITY: ICD-10-CM

## 2022-11-02 RX ORDER — CEFDINIR 300 MG/1
300 CAPSULE ORAL 2 TIMES DAILY
Qty: 14 CAPSULE | Refills: 0 | Status: SHIPPED | OUTPATIENT
Start: 2022-11-02 | End: 2022-11-07 | Stop reason: SDUPTHER

## 2022-11-02 RX ORDER — LEVOFLOXACIN 750 MG/1
750 TABLET ORAL DAILY
Qty: 7 TABLET | Refills: 0 | Status: SHIPPED | OUTPATIENT
Start: 2022-11-02 | End: 2022-11-07 | Stop reason: SDUPTHER

## 2022-11-02 RX ORDER — DOXYCYCLINE 100 MG/1
100 TABLET ORAL 2 TIMES DAILY
Qty: 14 TABLET | Refills: 0 | Status: SHIPPED | OUTPATIENT
Start: 2022-11-02 | End: 2022-11-07 | Stop reason: SDUPTHER

## 2022-11-02 NOTE — TELEPHONE ENCOUNTER
2 patient identifiers verified. Spoke with patient an informed him that we will need to extend his antibiotics for another week until redness of leg has resolved. Recommend to patient to make follow-up visit in about 1 week to reassess leg. Also recommended that he continue with probiotics for gut  health and to watch for any diarrhea and abdominal issues. Patient agrees to plan and will call back to make follow-up appointment.

## 2022-11-06 NOTE — PROGRESS NOTES
Nneka Gaytan is a 60 y.o. male  and presents with   Chief Complaint   Patient presents with    Wound Infection     Follow up      Cellulitis follow-up:   Patient is here today after another extension of antibiotics for a week. Infectious diseases was consulted at last visit and they recommend continual antibiotic treatment until leg is completely healed. Patient has one more dose left. Denies fever, chills, malaise, pain, worsening erythema/edema, numbness/tingling, chest pain or SOB. Current Outpatient Medications on File Prior to Visit   Medication Sig Dispense Refill    L.acid,para-B. bifidum-S.therm (RISAQUAD) 8 billion cell cap cap Take 1 Capsule by mouth daily. 30 Capsule 1    cefdinir (OMNICEF) 300 mg capsule Take 1 Capsule by mouth two (2) times a day for 7 days. 14 Capsule 0    doxycycline (ADOXA) 100 mg tablet Take 1 Tablet by mouth two (2) times a day for 7 days. 14 Tablet 0    levoFLOXacin (Levaquin) 750 mg tablet Take 1 Tablet by mouth daily for 7 days. 7 Tablet 0    ascorbic acid, vitamin C, (VITAMIN C) 500 mg tablet Take  by mouth. MEN'S MULTI-VITAMIN PO Take  by mouth. No current facility-administered medications on file prior to visit.       Past Medical History:   Diagnosis Date    Inguinal hernia     Spider veins     Varicose vein of leg      Past Surgical History:   Procedure Laterality Date    HX COLONOSCOPY      HX HERNIA REPAIR  2021    HX ORTHOPAEDIC  2008    IR ABLATION VEIN EXT INITIAL  09/26/2022    Laser Ablation     Social History     Socioeconomic History    Marital status:      Spouse name: Not on file    Number of children: Not on file    Years of education: Not on file    Highest education level: Not on file   Occupational History    Not on file   Tobacco Use    Smoking status: Never    Smokeless tobacco: Never   Vaping Use    Vaping Use: Never used   Substance and Sexual Activity    Alcohol use: Yes     Comment: 3-4 beers or rum & coke daily    Drug use: Never    Sexual activity: Not on file   Other Topics Concern    Not on file   Social History Narrative    Not on file     Social Determinants of Health     Financial Resource Strain: Not on file   Food Insecurity: Not on file   Transportation Needs: Not on file   Physical Activity: Insufficiently Active    Days of Exercise per Week: 3 days    Minutes of Exercise per Session: 30 min   Stress: Not on file   Social Connections: Not on file   Intimate Partner Violence: Not At Risk    Fear of Current or Ex-Partner: No    Emotionally Abused: No    Physically Abused: No    Sexually Abused: No   Housing Stability: Not on file     No Known Allergies    Review of Systems   Constitutional:  Negative for chills, fever, malaise/fatigue and weight loss. Respiratory:  Negative for cough and shortness of breath. Cardiovascular:  Negative for chest pain, palpitations and leg swelling. Gastrointestinal:  Negative for abdominal pain, blood in stool, constipation, diarrhea, heartburn, nausea and vomiting. Genitourinary:  Negative for dysuria, frequency, hematuria and urgency. Skin:         Cellulitis of left lower extremity    Neurological:  Negative for dizziness, tingling, weakness and headaches. Physical Examination:    Visit Vitals  /68 (BP 1 Location: Right arm, BP Patient Position: Sitting, BP Cuff Size: Adult)   Pulse 65   Resp 16   Ht 6' 5\" (1.956 m)   Wt 252 lb 3.2 oz (114.4 kg)   SpO2 95%   BMI 29.91 kg/m²       Physical Exam  Vitals and nursing note reviewed. Constitutional:       General: He is not in acute distress. Appearance: Normal appearance. Eyes:      Conjunctiva/sclera: Conjunctivae normal.      Pupils: Pupils are equal, round, and reactive to light. Neck:      Thyroid: No thyromegaly or thyroid tenderness. Vascular: No carotid bruit. Cardiovascular:      Rate and Rhythm: Normal rate and regular rhythm. Pulses: Normal pulses. Heart sounds: Normal heart sounds. Pulmonary:      Effort: Pulmonary effort is normal. No respiratory distress. Breath sounds: Normal breath sounds. Musculoskeletal:      Cervical back: No tenderness. Right lower leg: No edema. Left lower leg: No edema. Lymphadenopathy:      Cervical: No cervical adenopathy. Skin:     General: Skin is warm and dry. Comments: Entry site of vascular surgery there is about 1 inch in diameter of mild erythema noted. No drainage noted on entry site. Entry site is well-healed. +blanching. Upon palpation is there some induration. Neurological:      General: No focal deficit present. Mental Status: He is alert and oriented to person, place, and time. Mental status is at baseline. Gait: Gait normal.          Assessment/Plan:  Differential diagnosis and treatment options reviewed with patient who is in agreement with treatment plan as outlined below. 1. Cellulitis of left lower extremity  Due to erythema and induration will treat for an additional week. He will follow-up again in 1 week. - cefdinir (OMNICEF) 300 mg capsule; Take 1 Capsule by mouth two (2) times a day for 7 days. Dispense: 14 Capsule; Refill: 0  - doxycycline (ADOXA) 100 mg tablet; Take 1 Tablet by mouth two (2) times a day for 7 days. Dispense: 14 Tablet; Refill: 0  - levoFLOXacin (Levaquin) 750 mg tablet; Take 1 Tablet by mouth daily for 7 days. Dispense: 7 Tablet; Refill: 0         Medication Side Effects and Warnings were discussed with patient: yes  Patient Labs were reviewed: yes  Patient Past Records were reviewed:  yes    Verbal and written instructions (see AVS) provided. Patient expresses understanding and agreement of diagnosis and treatment plan.     Azra Maya NP

## 2022-11-07 ENCOUNTER — OFFICE VISIT (OUTPATIENT)
Dept: FAMILY MEDICINE CLINIC | Age: 62
End: 2022-11-07
Payer: OTHER GOVERNMENT

## 2022-11-07 VITALS
HEART RATE: 65 BPM | SYSTOLIC BLOOD PRESSURE: 136 MMHG | BODY MASS INDEX: 29.78 KG/M2 | OXYGEN SATURATION: 95 % | HEIGHT: 77 IN | DIASTOLIC BLOOD PRESSURE: 68 MMHG | WEIGHT: 252.2 LBS | RESPIRATION RATE: 16 BRPM

## 2022-11-07 DIAGNOSIS — L03.116 CELLULITIS OF LEFT LOWER EXTREMITY: Primary | ICD-10-CM

## 2022-11-07 PROCEDURE — 99213 OFFICE O/P EST LOW 20 MIN: CPT

## 2022-11-07 RX ORDER — LEVOFLOXACIN 750 MG/1
750 TABLET ORAL DAILY
Qty: 7 TABLET | Refills: 0 | Status: SHIPPED | OUTPATIENT
Start: 2022-11-07 | End: 2022-11-14

## 2022-11-07 RX ORDER — CEFDINIR 300 MG/1
300 CAPSULE ORAL 2 TIMES DAILY
Qty: 14 CAPSULE | Refills: 0 | Status: SHIPPED | OUTPATIENT
Start: 2022-11-07 | End: 2022-11-14

## 2022-11-07 RX ORDER — DOXYCYCLINE 100 MG/1
100 TABLET ORAL 2 TIMES DAILY
Qty: 14 TABLET | Refills: 0 | Status: SHIPPED | OUTPATIENT
Start: 2022-11-07 | End: 2022-11-14

## 2022-11-07 NOTE — PROGRESS NOTES
Chief Complaint   Patient presents with    Wound Infection     Follow up          Health Maintenance Due   Topic Date Due    Shingrix Vaccine Age 50> (1 of 2) Never done    Flu Vaccine (1) 08/01/2022    COVID-19 Vaccine (4 - Booster for Moderna series) 08/08/2022     1. \"Have you been to the ER, urgent care clinic since your last visit? Hospitalized since your last visit? \" No    2. \"Have you seen or consulted any other health care providers outside of the [address] [place] [address] since your last visit? \" No     3. For patients aged 39-70: Has the patient had a colonoscopy / FIT/ Cologuard? Yes - no Care Gap present      If the patient is female:    4. For patients aged 41-77: Has the patient had a mammogram within the past 2 years? NA - based on age or sex      11. For patients aged 21-65: Has the patient had a pap smear?  NA - based on age or sex

## 2022-11-14 ENCOUNTER — OFFICE VISIT (OUTPATIENT)
Dept: FAMILY MEDICINE CLINIC | Age: 62
End: 2022-11-14
Payer: OTHER GOVERNMENT

## 2022-11-14 VITALS
BODY MASS INDEX: 29.76 KG/M2 | HEART RATE: 74 BPM | OXYGEN SATURATION: 95 % | DIASTOLIC BLOOD PRESSURE: 83 MMHG | SYSTOLIC BLOOD PRESSURE: 127 MMHG | HEIGHT: 77 IN | TEMPERATURE: 98 F | WEIGHT: 252 LBS | RESPIRATION RATE: 18 BRPM

## 2022-11-14 DIAGNOSIS — L03.116 CELLULITIS OF LEFT LOWER EXTREMITY: Primary | ICD-10-CM

## 2022-11-14 DIAGNOSIS — Z79.2 ANTIBIOTIC LONG-TERM USE: ICD-10-CM

## 2022-11-14 PROCEDURE — 99213 OFFICE O/P EST LOW 20 MIN: CPT

## 2022-11-14 NOTE — PROGRESS NOTES
Freddy Marina is a 60 y.o. male  Chief Complaint   Patient presents with    Follow-up   1. Have you been to the ER, urgent care clinic since your last visit? Hospitalized since your last visit? No    2. Have you seen or consulted any other health care providers outside of the [address] [place] [address] since your last visit? Include any pap smears or colon screening.  No  Health Maintenance   Topic Date Due    Shingrix Vaccine Age 49> (1 of 2) Never done    Flu Vaccine (1) 08/01/2022    COVID-19 Vaccine (4 - Booster for Moderna series) 08/08/2022    DTaP/Tdap/Td series (2 - Td or Tdap) 04/12/2023    Colorectal Cancer Screening Combo  09/15/2023    Depression Screen  11/07/2023    Lipid Screen  01/31/2027    Hepatitis C Screening  Completed    Pneumococcal 0-64 years  Aged Out     Visit Vitals  /83 (BP 1 Location: Left upper arm, BP Patient Position: At rest, BP Cuff Size: Large adult)   Pulse 74   Temp 98 °F (36.7 °C) (Skin)   Resp 18   Ht 6' 5\" (1.956 m)   Wt 252 lb (114.3 kg)   SpO2 95%   BMI 29.88 kg/m²

## 2022-11-14 NOTE — PROGRESS NOTES
Jesus Smith is a 60 y.o. male  and presents with   Chief Complaint   Patient presents with    Follow-up     Cellulitis follow-up:   Patient has now been on oral antibiotics for 3 weeks. He has about 1 dose left. Infectious diseases was consulted at last visit and they recommend continual antibiotic treatment until leg is completely healed. Patient states his leg continues to get better and has not had any worsening symptoms. Denies fever, chills, malaise, pain, worsening erythema/edema, numbness/tingling, chest pain or SOB. Current Outpatient Medications on File Prior to Visit   Medication Sig Dispense Refill    cefdinir (OMNICEF) 300 mg capsule Take 1 Capsule by mouth two (2) times a day for 7 days. 14 Capsule 0    doxycycline (ADOXA) 100 mg tablet Take 1 Tablet by mouth two (2) times a day for 7 days. 14 Tablet 0    levoFLOXacin (Levaquin) 750 mg tablet Take 1 Tablet by mouth daily for 7 days. 7 Tablet 0    ascorbic acid, vitamin C, (VITAMIN C) 500 mg tablet Take  by mouth. MEN'S MULTI-VITAMIN PO Take  by mouth. [DISCONTINUED] L.acid,para-B. bifidum-S.therm (RISAQUAD) 8 billion cell cap cap Take 1 Capsule by mouth daily. 30 Capsule 1     No current facility-administered medications on file prior to visit.       Past Medical History:   Diagnosis Date    Inguinal hernia     Spider veins     Varicose vein of leg      Past Surgical History:   Procedure Laterality Date    HX COLONOSCOPY      HX HERNIA REPAIR  2021    HX ORTHOPAEDIC  2008    IR ABLATION VEIN EXT INITIAL  09/26/2022    Laser Ablation     Social History     Socioeconomic History    Marital status:      Spouse name: Not on file    Number of children: Not on file    Years of education: Not on file    Highest education level: Not on file   Occupational History    Not on file   Tobacco Use    Smoking status: Never    Smokeless tobacco: Never   Vaping Use    Vaping Use: Never used   Substance and Sexual Activity    Alcohol use: Yes     Comment: 3-4 beers or rum & coke daily    Drug use: Never    Sexual activity: Not on file   Other Topics Concern    Not on file   Social History Narrative    Not on file     Social Determinants of Health     Financial Resource Strain: Low Risk     Difficulty of Paying Living Expenses: Not hard at all   Food Insecurity: No Food Insecurity    Worried About Running Out of Food in the Last Year: Never true    Ran Out of Food in the Last Year: Never true   Transportation Needs: Not on file   Physical Activity: Insufficiently Active    Days of Exercise per Week: 3 days    Minutes of Exercise per Session: 30 min   Stress: Not on file   Social Connections: Not on file   Intimate Partner Violence: Not At Risk    Fear of Current or Ex-Partner: No    Emotionally Abused: No    Physically Abused: No    Sexually Abused: No   Housing Stability: Not on file     No Known Allergies    Review of Systems   Constitutional:  Negative for chills, fever, malaise/fatigue and weight loss. Respiratory:  Negative for cough and shortness of breath. Cardiovascular:  Negative for chest pain, palpitations and leg swelling. Gastrointestinal:  Negative for abdominal pain, blood in stool, constipation, diarrhea, heartburn, nausea and vomiting. Genitourinary:  Negative for dysuria, frequency, hematuria and urgency. Skin:         Cellulitis of left lower extremity    Neurological:  Negative for dizziness, tingling, weakness and headaches. Physical Examination:    Visit Vitals  /83 (BP 1 Location: Left upper arm, BP Patient Position: At rest, BP Cuff Size: Large adult)   Pulse 74   Temp 98 °F (36.7 °C) (Skin)   Resp 18   Ht 6' 5\" (1.956 m)   Wt 252 lb (114.3 kg)   SpO2 95%   BMI 29.88 kg/m²       Physical Exam  Vitals and nursing note reviewed. Constitutional:       General: He is not in acute distress. Appearance: Normal appearance.    Eyes:      Conjunctiva/sclera: Conjunctivae normal.      Pupils: Pupils are equal, round, and reactive to light. Neck:      Thyroid: No thyromegaly or thyroid tenderness. Vascular: No carotid bruit. Cardiovascular:      Rate and Rhythm: Normal rate and regular rhythm. Pulses: Normal pulses. Heart sounds: Normal heart sounds. Pulmonary:      Effort: Pulmonary effort is normal. No respiratory distress. Breath sounds: Normal breath sounds. Musculoskeletal:      Cervical back: No tenderness. Right lower leg: No edema. Left lower leg: No edema. Lymphadenopathy:      Cervical: No cervical adenopathy. Skin:     General: Skin is warm and dry. Comments: Entry site of vascular surgery there is less than 0.5 inch diameter of pink colored skin. No drainage noted on entry site. Entry site is well-healed. +blanching. Upon palpation no induration noted. Neurological:      General: No focal deficit present. Mental Status: He is alert and oriented to person, place, and time. Mental status is at baseline. Gait: Gait normal.       Assessment/Plan:  Differential diagnosis and treatment options reviewed with patient who is in agreement with treatment plan as outlined below. ICD-10-CM ICD-9-CM    1. Cellulitis of left lower extremity  L03.116 682.6       2. Antibiotic long-term use  Z79.2 V58.62 L.acid,para-B. bifidum-S.therm (RISAQUAD) 8 billion cell cap cap      Recommend patient to finish antibiotics. Based on my assessment today I do believe that the infection has resolved however, patient will continue to monitor leg and systemic symptoms at home. If he notices symptoms are worsening he is aware to return to the clinic ASAP for treatment. Continue probiotic medication while still on antibiotic treatment. Medication Side Effects and Warnings were discussed with patient: yes  Patient Labs were reviewed: yes  Patient Past Records were reviewed:  yes    Verbal and written instructions (see AVS) provided.   Patient expresses understanding and agreement of diagnosis and treatment plan.     Ferny Nelson, NP

## 2023-01-16 ENCOUNTER — OFFICE VISIT (OUTPATIENT)
Dept: FAMILY MEDICINE CLINIC | Age: 63
End: 2023-01-16
Payer: OTHER GOVERNMENT

## 2023-01-16 VITALS
SYSTOLIC BLOOD PRESSURE: 128 MMHG | TEMPERATURE: 98.1 F | DIASTOLIC BLOOD PRESSURE: 78 MMHG | OXYGEN SATURATION: 97 % | BODY MASS INDEX: 29.99 KG/M2 | RESPIRATION RATE: 17 BRPM | HEART RATE: 65 BPM | HEIGHT: 77 IN | WEIGHT: 254 LBS

## 2023-01-16 DIAGNOSIS — G89.29 CHRONIC PAIN OF RIGHT KNEE: Primary | ICD-10-CM

## 2023-01-16 DIAGNOSIS — M25.561 CHRONIC PAIN OF RIGHT KNEE: Primary | ICD-10-CM

## 2023-01-16 PROBLEM — S83.289A ACUTE LATERAL MENISCAL TEAR: Status: ACTIVE | Noted: 2019-04-08

## 2023-01-16 PROCEDURE — 99213 OFFICE O/P EST LOW 20 MIN: CPT

## 2023-01-16 NOTE — PROGRESS NOTES
Deb Tay is a 60 y.o. male  and presents with   Chief Complaint   Patient presents with    Knee Swelling     Right knee      In 2008 patient had a torn meniscus surgery but unsure which knee and then prior to [facility] in [place] he was told that he tore his meniscus again but in the opposite knee from the surgery in 2008. No interventions were done. He cannot recall which knee has been repaired and which was has not been. This past November he trip on a wire a fell on his knee. He comes in today because he continues to have some discomfort on the right knee. Not worsening since his fall. Pain is a 1/10 that he describes as a \"discomfort\"   Discomfort does not impair his daily life. He is still able to walk on the treadmill for a couple of miles without the knee pain worsening. He does state he does limp from the discomfort. He has not really done any interventions for the knee discomfort. Would like a referral to orthopedic today for possible intervention. Current Outpatient Medications on File Prior to Visit   Medication Sig Dispense Refill    ascorbic acid, vitamin C, (VITAMIN C) 500 mg tablet Take  by mouth. MEN'S MULTI-VITAMIN PO Take  by mouth. [DISCONTINUED] L.acid,para-B. bifidum-S.therm (RISAQUAD) 8 billion cell cap cap Take 1 Capsule by mouth daily. (Patient not taking: Reported on 1/16/2023) 30 Capsule 1     No current facility-administered medications on file prior to visit.       Past Medical History:   Diagnosis Date    Inguinal hernia     Spider veins     Varicose vein of leg      Past Surgical History:   Procedure Laterality Date    HX COLONOSCOPY      HX HERNIA REPAIR  2021    HX ORTHOPAEDIC  2008    IR ABLATION VEIN EXT INITIAL  09/26/2022    Laser Ablation     Social History     Socioeconomic History    Marital status:      Spouse name: Not on file    Number of children: Not on file    Years of education: Not on file    Highest education level: Not on file   Occupational History    Not on file   Tobacco Use    Smoking status: Never    Smokeless tobacco: Never   Vaping Use    Vaping Use: Never used   Substance and Sexual Activity    Alcohol use: Yes     Comment: 3-4 beers or rum & coke daily    Drug use: Never    Sexual activity: Not on file   Other Topics Concern    Not on file   Social History Narrative    Not on file     Social Determinants of Health     Financial Resource Strain: Low Risk     Difficulty of Paying Living Expenses: Not hard at all   Food Insecurity: No Food Insecurity    Worried About Running Out of Food in the Last Year: Never true    Ran Out of Food in the Last Year: Never true   Transportation Needs: Not on file   Physical Activity: Insufficiently Active    Days of Exercise per Week: 3 days    Minutes of Exercise per Session: 30 min   Stress: Not on file   Social Connections: Not on file   Intimate Partner Violence: Not At Risk    Fear of Current or Ex-Partner: No    Emotionally Abused: No    Physically Abused: No    Sexually Abused: No   Housing Stability: Not on file     No Known Allergies    Review of Systems   Constitutional:  Negative for chills, diaphoresis, fever, malaise/fatigue and weight loss. Respiratory:  Negative for cough and shortness of breath. Cardiovascular:  Negative for chest pain, palpitations and leg swelling. Musculoskeletal:  Positive for joint pain. Negative for back pain, falls, myalgias and neck pain. Neurological:  Negative for dizziness, tingling, weakness and headaches. Endo/Heme/Allergies: Negative. Physical Examination:    Visit Vitals  /78 (BP 1 Location: Right arm, BP Patient Position: Sitting, BP Cuff Size: Large adult)   Pulse 65   Temp 98.1 °F (36.7 °C) (Temporal)   Resp 17   Ht 6' 5\" (1.956 m)   Wt 254 lb (115.2 kg)   SpO2 97%   BMI 30.12 kg/m²      Physical Exam  Vitals and nursing note reviewed. Constitutional:       General: He is not in acute distress. Appearance: Normal appearance. HENT:      Head: Normocephalic. Right Ear: Tympanic membrane, ear canal and external ear normal.      Left Ear: Tympanic membrane, ear canal and external ear normal.      Nose: Nose normal.      Mouth/Throat:      Mouth: Mucous membranes are moist.      Pharynx: Oropharynx is clear. Eyes:      Extraocular Movements: Extraocular movements intact. Conjunctiva/sclera: Conjunctivae normal.      Pupils: Pupils are equal, round, and reactive to light. Neck:      Thyroid: No thyromegaly or thyroid tenderness. Vascular: No carotid bruit. Cardiovascular:      Rate and Rhythm: Normal rate and regular rhythm. Pulses: Normal pulses. Heart sounds: Normal heart sounds. Pulmonary:      Effort: Pulmonary effort is normal. No respiratory distress. Breath sounds: Normal breath sounds. Musculoskeletal:      Cervical back: No tenderness. Right knee: No swelling, deformity, effusion, erythema, bony tenderness or crepitus. Normal range of motion. Normal patellar mobility. Instability Tests: Anterior drawer test negative. Posterior drawer test negative. Medial Magali test negative and lateral Magali test negative. Left knee: Normal.      Right lower leg: No edema. Left lower leg: No edema. Comments: Tenderness on the lateral side of the right knee. Lymphadenopathy:      Cervical: No cervical adenopathy. Skin:     General: Skin is warm and dry. Neurological:      General: No focal deficit present. Mental Status: He is alert and oriented to person, place, and time. Mental status is at baseline. Psychiatric:         Mood and Affect: Mood normal.         Behavior: Behavior normal.         Thought Content: Thought content normal.         Judgment: Judgment normal.        Assessment/Plan:  Differential diagnosis and treatment options reviewed with patient who is in agreement with treatment plan as outlined below. ICD-10-CM ICD-9-CM    1.  Chronic pain of right knee  M25.561 719.46 REFERRAL TO ORTHOPEDICS    G89.29 338.29       Patient would like to hold off on x-ray of knee after fall in November. He would prefer to see orthopedic first.   There is no swelling of the knee and no pain with palpation of bony prominence. Advised patient to wear a knee brace especially when he is exercising or doing other strenuous activity, ice/heat and take OTC NSAID as needed for pain/inflammation. Medication Side Effects and Warnings were discussed with patient: yes  Patient Labs were reviewed: yes  Patient Past Records were reviewed:  yes    Follow-up and Dispositions    Return if symptoms worsen or fail to improve. Verbal and written instructions (see AVS) provided. Patient expresses understanding and agreement of diagnosis and treatment plan.     Amadou Dominguez, NP

## 2023-01-16 NOTE — PROGRESS NOTES
Chief Complaint   Patient presents with    Knee Swelling     Right knee      Health Maintenance reviewed     1. Have you been to the ER, urgent care clinic since your last visit? Hospitalized since your last visit? No     2. Have you seen or consulted any other health care providers outside of the [address] [place] [address] since your last visit? Include any pap smears or colon screening.   No

## 2023-01-23 DIAGNOSIS — M25.561 RIGHT KNEE PAIN, UNSPECIFIED CHRONICITY: Primary | ICD-10-CM

## 2023-02-01 ENCOUNTER — OFFICE VISIT (OUTPATIENT)
Dept: ORTHOPEDIC SURGERY | Age: 63
End: 2023-02-01
Payer: OTHER GOVERNMENT

## 2023-02-01 ENCOUNTER — HOSPITAL ENCOUNTER (OUTPATIENT)
Dept: GENERAL RADIOLOGY | Age: 63
Discharge: HOME | End: 2023-02-01
Payer: OTHER GOVERNMENT

## 2023-02-01 VITALS
SYSTOLIC BLOOD PRESSURE: 156 MMHG | BODY MASS INDEX: 30.23 KG/M2 | DIASTOLIC BLOOD PRESSURE: 83 MMHG | WEIGHT: 256 LBS | TEMPERATURE: 98.3 F | HEIGHT: 77 IN | HEART RATE: 61 BPM | OXYGEN SATURATION: 99 %

## 2023-02-01 DIAGNOSIS — M17.11 UNILATERAL PRIMARY OSTEOARTHRITIS, RIGHT KNEE: Primary | ICD-10-CM

## 2023-02-01 DIAGNOSIS — M25.561 RIGHT KNEE PAIN, UNSPECIFIED CHRONICITY: ICD-10-CM

## 2023-02-01 PROCEDURE — 73564 X-RAY EXAM KNEE 4 OR MORE: CPT

## 2023-02-01 PROCEDURE — 99203 OFFICE O/P NEW LOW 30 MIN: CPT | Performed by: ORTHOPAEDIC SURGERY

## 2023-02-01 RX ORDER — DICLOFENAC SODIUM 75 MG/1
75 TABLET, DELAYED RELEASE ORAL 2 TIMES DAILY
Qty: 60 TABLET | Refills: 0 | Status: SHIPPED | OUTPATIENT
Start: 2023-02-01

## 2023-02-01 NOTE — PROGRESS NOTES
2/1/2023    Chief Complaint: Right knee pain    Assessment: Osteoarthritis right knee    Plan: This patient and I did discuss the many options in treating knee osteoarthritis. We did discuss that we could continue to seek out nonoperative modalities, such as: NSAIDs, oral and topical analgesics, knee injections, knee braces, physical therapy, stretching, strengthening, and weight loss strategies, activity modification, ambulatory assistive devices. The patient stated their understanding with this, but and would like to proceed with nonsurgical management in the form of bracing, diclofenac. HPI: This is a 60 y.o. male who complains of right knee pain. Onset was gradual.  The patient has had activity dependent pain for years. The patient has tried activity modification, physical therapy exercises, injections have not been attempted. The pain is in the anterior knee, it is mild in intensity. The patient feels unstable with the knee, fears falling, and has significant limitation with activities of daily living, recreation, and walks with a limp. Past Medical History:   Diagnosis Date    Inguinal hernia     Spider veins     Varicose vein of leg        Past Surgical History:   Procedure Laterality Date    HX COLONOSCOPY      HX HERNIA REPAIR  2021    HX ORTHOPAEDIC  2008    IR ABLATION VEIN EXT INITIAL  09/26/2022    Laser Ablation       Current Outpatient Medications on File Prior to Visit   Medication Sig Dispense Refill    ascorbic acid, vitamin C, (VITAMIN C) 500 mg tablet Take  by mouth. MEN'S MULTI-VITAMIN PO Take  by mouth. No current facility-administered medications on file prior to visit. No Known Allergies    History reviewed. No pertinent family history. Social History     Socioeconomic History    Marital status:    Tobacco Use    Smoking status: Never    Smokeless tobacco: Never   Vaping Use    Vaping Use: Never used   Substance and Sexual Activity    Alcohol use:  Yes Comment: 3-4 beers or rum & coke daily    Drug use: Never     Social Determinants of Health     Financial Resource Strain: Low Risk     Difficulty of Paying Living Expenses: Not hard at all   Food Insecurity: No Food Insecurity    Worried About Running Out of Food in the Last Year: Never true    Ran Out of Food in the Last Year: Never true         Review of Systems:       General: Denies headache, lethargy, fever, weight loss  Ears/Nose/Throat: Denies ear discharge, drainage, nosebleeds, hoarse voice, dental problems  Cardiovascular: Denies chest pain, shortness of breath  Lungs: Denies chest pain, breathing problems, wheezing, pneumonia  Stomach: Denies stomach pain, heartburn, constipation, irritable bowel  Skin: Denies rash, sores, open wounds  Musculoskeletal: Admits to knee pain  Genitourinary: Denies dysuria, hematuria, polyuria  Gastrointestinal: Denies constipation, obstipation, diarrhea  Neurological: Denies changes in sight, smell, hearing, taste, seizures. Denies loss of consciousness.   Psychiatric: Denies depression, sleep pattern changes, anxiety, change in personality  Endocrine: Denies mood swings, heat or cold intolerance  Hematologic/Lymphatic: Denies anemia, purpura, petechia  Allergic/Immunologic: Denies swelling of throat, pain or swelling at lymph nodes      Physical Examination:    Visit Vitals  BP (!) 156/83 (BP 1 Location: Right arm, BP Patient Position: Sitting, BP Cuff Size: Large adult)   Pulse 61   Temp 98.3 °F (36.8 °C) (Tympanic)   Ht 6' 5\" (1.956 m)   Wt 256 lb (116.1 kg)   SpO2 99%   BMI 30.36 kg/m²        General: AOX3, no apparent distress  Psychiatric: mood and affect appropriate  Lungs: breathing is symmetric and unlabored bilaterally  Heart: regular rate and rhythm  Abdomen: no guarding  Head: normocephalic, atraumatic  Skin: No significant abnormalities, good turgor  Sensation intact to light touch: L1-S1 dermatomes  Muscular exam: 5/5 strength in all major muscle groups unless noted in specialty exam.    Extremities:      Left upper extremity: Full active and passive range of motion without pain, deformity, no open wound, strength 5/5 in all major muscle groups. Right upper extremity: Full active and passive range of motion without pain, deformity, no open wound, strength 5/5 in all major muscle groups. Left lower extremity: Full active and passive range of motion without pain, deformity, no open wound, strength 5/5 in all major muscle groups. Right lower extremity:  No deformity is noted. Range of motion of the knee is 0-110. Ligamentous testing of the knee indicates stability of the the MCL, LCL, PCL, and ACL. Lachman's, anterior and posterior drawer tests are specifically negative. no joint line tenderness to palpation is noted. Popliteal area is unremarkable. 1+  for effusion. + patellar crepitus. Patella tracks centrally. Pivot shift is negative. Strength testing is indicative of 5/5 strength at hip flexion, extension, knee flexion and extension, tibialis anterior, EHL, and FHL. Sensation is intact to light touch in the L1-S1 dermatomes. Capillary refill is less than 2 seconds in the toes. Diagnostics:    Pertinent Diagnostics:  Xrays are available of the right knee, they indicate severe osteoarthritis of the knee joint, no significant other findings, no other osseus abnormalities, fractures, or dislocations. Procedures: none    Mr. Rachel Saunders has a reminder for a \"due or due soon\" health maintenance. I have asked that he contact his primary care provider for follow-up on this health maintenance.

## 2023-02-01 NOTE — PROGRESS NOTES
Identified pt with two pt identifiers (name and ). Reviewed chart in preparation for visit and have obtained necessary documentation. Grayson Moreno is a 60 y.o. male  Chief Complaint   Patient presents with    Knee Pain     RT Knee     Visit Vitals  BP (!) 156/83 (BP 1 Location: Right arm, BP Patient Position: Sitting, BP Cuff Size: Large adult) Comment: Pt states he has white coat   Pulse 61   Temp 98.3 °F (36.8 °C) (Tympanic)   Ht 6' 5\" (1.956 m)   Wt 256 lb (116.1 kg)   SpO2 99%   BMI 30.36 kg/m²     1. Have you been to the ER, urgent care clinic since your last visit? Hospitalized since your last visit? No    2. Have you seen or consulted any other health care providers outside of the [address] [place] [address] since your last visit? Include any pap smears or colon screening.  No

## 2023-03-02 RX ORDER — DICLOFENAC SODIUM 75 MG/1
TABLET, DELAYED RELEASE ORAL
Qty: 60 TABLET | Refills: 0 | Status: SHIPPED | OUTPATIENT
Start: 2023-03-02

## 2023-04-21 ENCOUNTER — OFFICE VISIT (OUTPATIENT)
Dept: FAMILY MEDICINE CLINIC | Age: 63
End: 2023-04-21

## 2023-04-21 VITALS
WEIGHT: 252.2 LBS | HEIGHT: 77 IN | BODY MASS INDEX: 29.78 KG/M2 | SYSTOLIC BLOOD PRESSURE: 127 MMHG | RESPIRATION RATE: 16 BRPM | OXYGEN SATURATION: 96 % | TEMPERATURE: 96.8 F | HEART RATE: 63 BPM | DIASTOLIC BLOOD PRESSURE: 60 MMHG

## 2023-04-21 DIAGNOSIS — H61.22 IMPACTED CERUMEN OF LEFT EAR: ICD-10-CM

## 2023-04-21 DIAGNOSIS — R19.5 LOOSE STOOLS: Primary | ICD-10-CM

## 2023-04-21 NOTE — PROGRESS NOTES
Health Maintenance Due   Topic Date Due    Colorectal Cancer Screening Combo  Never done    Shingles Vaccine (1 of 2) Never done    COVID-19 Vaccine (4 - Booster for Moderna series) 08/08/2022    DTaP/Tdap/Td series (2 - Td or Tdap) 04/12/2023     1. \"Have you been to the ER, urgent care clinic since your last visit? Hospitalized since your last visit? \" No    2. \"Have you seen or consulted any other health care providers outside of the [address] [place] [address] since your last visit? \" No     3. For patients aged 39-70: Has the patient had a colonoscopy / FIT/ Cologuard? No      If the patient is female:    4. For patients aged 41-77: Has the patient had a mammogram within the past 2 years? NA - based on age or sex      11. For patients aged 21-65: Has the patient had a pap smear?  NA - based on age or sex

## 2023-04-21 NOTE — PROGRESS NOTES
HPI  Eric Falcon is a 58 y.o. male who presents with loose stool and cerumen concern    Loose stool sat-wed this week. Felt fine starting yst (thurs). No vomiting at any point. No pain, blood or mucous in stool    Feeling fine today. Wife encouraged him to keep apt    She also wanted his ears checked. He does not perceive a hearing problem    PMHx:  Past Medical History:   Diagnosis Date    Inguinal hernia     Spider veins     Varicose vein of leg        Meds:   Current Outpatient Medications   Medication Sig Dispense Refill    ascorbic acid, vitamin C, (VITAMIN C) 500 mg tablet Take  by mouth. MEN'S MULTI-VITAMIN PO Take  by mouth. diclofenac EC (VOLTAREN) 75 mg EC tablet Take 1 Tablet by mouth two (2) times a day. (Patient not taking: Reported on 4/21/2023) 60 Tablet 0       Allergies:   No Known Allergies    Smoker:  Social History     Tobacco Use   Smoking Status Never   Smokeless Tobacco Never       ETOH:   Social History     Substance and Sexual Activity   Alcohol Use Yes    Comment: 3-4 beers or rum & coke daily       FH: History reviewed. No pertinent family history. ROS:   As listed in HPI. In addition:  Constitutional:   No headache, fever, fatigue, weight loss or weight gain      Cardiac:    No chest pain      Resp:   No cough or shortness of breath      Neuro   No loss of consciousness, dizziness, seizures      Physical Exam:  Blood pressure 127/60, pulse 63, temperature 96.8 °F (36 °C), temperature source Temporal, resp. rate 16, height 6' 5\" (1.956 m), weight 252 lb 3.2 oz (114.4 kg), SpO2 96 %. GEN: No apparent distress. Alert and oriented and responds to all questions appropriately. NEUROLOGIC:  No focal neurologic deficits. Strength and sensation grossly intact. Coordination and gait grossly intact. EXT: Well perfused. No edema. SKIN: No obvious rashes.   Lungs ctab  Cv rrr no murmur  Ears clear right, lt cerumen obscurs TM but does not appear to be affecting hearing, passes wisper test         Assessment and Plan     Loose stool  Self limited without red flags, probably a mild case of the stomach bug that is going around    Cerumen is not bothering him  Opt to no clean out      ICD-10-CM ICD-9-CM    1. Loose stools  R19.5 787.7       2. Impacted cerumen of left ear  H61.22 380.4           AVS given.  Pt expressed understanding of instructions

## 2023-05-15 RX ORDER — DICLOFENAC SODIUM 75 MG/1
75 TABLET, DELAYED RELEASE ORAL 2 TIMES DAILY
Qty: 60 TABLET | Refills: 0 | Status: SHIPPED | OUTPATIENT
Start: 2023-05-15